# Patient Record
Sex: MALE | Race: OTHER | Employment: FULL TIME | URBAN - METROPOLITAN AREA
[De-identification: names, ages, dates, MRNs, and addresses within clinical notes are randomized per-mention and may not be internally consistent; named-entity substitution may affect disease eponyms.]

---

## 2020-06-24 ENCOUNTER — HOSPITAL ENCOUNTER (EMERGENCY)
Facility: HOSPITAL | Age: 40
Discharge: HOME | End: 2020-06-24
Attending: EMERGENCY MEDICINE
Payer: COMMERCIAL

## 2020-06-24 ENCOUNTER — APPOINTMENT (OUTPATIENT)
Dept: CT IMAGING | Facility: HOSPITAL | Age: 40
End: 2020-06-24
Attending: PHYSICIAN ASSISTANT
Payer: COMMERCIAL

## 2020-06-24 VITALS
BODY MASS INDEX: 28.25 KG/M2 | TEMPERATURE: 99 F | SYSTOLIC BLOOD PRESSURE: 101 MMHG | HEIGHT: 67 IN | OXYGEN SATURATION: 97 % | WEIGHT: 180 LBS | HEART RATE: 60 BPM | RESPIRATION RATE: 15 BRPM | DIASTOLIC BLOOD PRESSURE: 63 MMHG

## 2020-06-24 DIAGNOSIS — N20.1 LEFT URETERAL STONE: Primary | ICD-10-CM

## 2020-06-24 PROCEDURE — 99284 EMERGENCY DEPT VISIT MOD MDM: CPT

## 2020-06-24 PROCEDURE — 85025 COMPLETE CBC W/AUTO DIFF WBC: CPT | Performed by: EMERGENCY MEDICINE

## 2020-06-24 PROCEDURE — 83690 ASSAY OF LIPASE: CPT

## 2020-06-24 PROCEDURE — 80053 COMPREHEN METABOLIC PANEL: CPT

## 2020-06-24 PROCEDURE — 80053 COMPREHEN METABOLIC PANEL: CPT | Performed by: EMERGENCY MEDICINE

## 2020-06-24 PROCEDURE — 85025 COMPLETE CBC W/AUTO DIFF WBC: CPT

## 2020-06-24 PROCEDURE — 81001 URINALYSIS AUTO W/SCOPE: CPT | Performed by: EMERGENCY MEDICINE

## 2020-06-24 PROCEDURE — 74176 CT ABD & PELVIS W/O CONTRAST: CPT | Performed by: PHYSICIAN ASSISTANT

## 2020-06-24 PROCEDURE — 96374 THER/PROPH/DIAG INJ IV PUSH: CPT

## 2020-06-24 PROCEDURE — 83690 ASSAY OF LIPASE: CPT | Performed by: EMERGENCY MEDICINE

## 2020-06-24 RX ORDER — KETOROLAC TROMETHAMINE 30 MG/ML
15 INJECTION, SOLUTION INTRAMUSCULAR; INTRAVENOUS ONCE
Status: COMPLETED | OUTPATIENT
Start: 2020-06-24 | End: 2020-06-24

## 2020-06-24 NOTE — ED INITIAL ASSESSMENT (HPI)
Pt here due to lower right ABD pain that started this am about 1100. Pt has a Hx of kidney stones and this feels the same way.

## 2020-06-24 NOTE — ED PROVIDER NOTES
Patient Seen in: [facility] Emergency Department      History   Patient presents with:  Abdomen/Flank Pain    Stated Complaint: abdominal pain, hx of kidney stones, pain feels similiar.      HPI    CHIEF COMPLAINT: Left lower quadrant pain, concerned for stated complaint: abdominal pain, hx of kidney stones, pain feels similiar. Other systems are as noted in HPI. Constitutional and vital signs reviewed. All other systems reviewed and negative except as noted above.     Physical Exam     ED Elijah Gaytan limits   URINALYSIS WITH CULTURE REFLEX - Abnormal; Notable for the following components:    Clarity Urine Hazy (*)     Glucose Urine 50  (*)     Blood Urine Large (*)     RBC URINE >10 (*)     Mucous Urine 1+ (*)     All other components within normal [place] ureteral calculus. No obvious renal mass. ADRENALS:  No mass or enlargement. LIVER:  There is fatty infiltration of the liver. BILIARY:  Status post cholecystectomy. PANCREAS:  No lesion, fluid collection, ductal dilatation, or atrophy.   SPLEEN:  No enl (primary encounter diagnosis)    Disposition:  Discharge  6/24/2020  6:12 pm    Follow-up:  Luis Daniel Sharpe MD  [address] [place] [address] [phone]      urology follow up          Medications Prescribed:  There are no discharge

## 2020-12-02 ENCOUNTER — APPOINTMENT (OUTPATIENT)
Dept: CT IMAGING | Facility: HOSPITAL | Age: 40
End: 2020-12-02
Attending: EMERGENCY MEDICINE
Payer: COMMERCIAL

## 2020-12-02 ENCOUNTER — HOSPITAL ENCOUNTER (EMERGENCY)
Facility: HOSPITAL | Age: 40
Discharge: HOME | End: 2020-12-03
Attending: EMERGENCY MEDICINE
Payer: COMMERCIAL

## 2020-12-02 DIAGNOSIS — K11.20 SIALOADENITIS: Primary | ICD-10-CM

## 2020-12-02 PROCEDURE — 87147 CULTURE TYPE IMMUNOLOGIC: CPT | Performed by: EMERGENCY MEDICINE

## 2020-12-02 PROCEDURE — 96375 TX/PRO/DX INJ NEW DRUG ADDON: CPT

## 2020-12-02 PROCEDURE — 93010 ELECTROCARDIOGRAM REPORT: CPT

## 2020-12-02 PROCEDURE — 99285 EMERGENCY DEPT VISIT HI MDM: CPT

## 2020-12-02 PROCEDURE — 87081 CULTURE SCREEN ONLY: CPT | Performed by: EMERGENCY MEDICINE

## 2020-12-02 PROCEDURE — 96374 THER/PROPH/DIAG INJ IV PUSH: CPT

## 2020-12-02 PROCEDURE — 87430 STREP A AG IA: CPT | Performed by: EMERGENCY MEDICINE

## 2020-12-02 PROCEDURE — 85025 COMPLETE CBC W/AUTO DIFF WBC: CPT | Performed by: EMERGENCY MEDICINE

## 2020-12-02 PROCEDURE — 80053 COMPREHEN METABOLIC PANEL: CPT | Performed by: EMERGENCY MEDICINE

## 2020-12-02 PROCEDURE — 70491 CT SOFT TISSUE NECK W/DYE: CPT | Performed by: EMERGENCY MEDICINE

## 2020-12-02 RX ORDER — ONDANSETRON 2 MG/ML
4 INJECTION INTRAMUSCULAR; INTRAVENOUS ONCE
Status: COMPLETED | OUTPATIENT
Start: 2020-12-02 | End: 2020-12-02

## 2020-12-02 RX ORDER — MORPHINE SULFATE 4 MG/ML
4 INJECTION, SOLUTION INTRAMUSCULAR; INTRAVENOUS ONCE
Status: COMPLETED | OUTPATIENT
Start: 2020-12-02 | End: 2020-12-02

## 2020-12-03 VITALS
OXYGEN SATURATION: 98 % | HEIGHT: 66 IN | SYSTOLIC BLOOD PRESSURE: 131 MMHG | WEIGHT: 180 LBS | HEART RATE: 99 BPM | BODY MASS INDEX: 28.93 KG/M2 | DIASTOLIC BLOOD PRESSURE: 80 MMHG | RESPIRATION RATE: 16 BRPM | TEMPERATURE: 99 F

## 2020-12-03 PROCEDURE — 93005 ELECTROCARDIOGRAM TRACING: CPT

## 2020-12-03 RX ORDER — HYDROCODONE BITARTRATE AND ACETAMINOPHEN 5; 325 MG/1; MG/1
1-2 TABLET ORAL EVERY 6 HOURS PRN
Qty: 10 TABLET | Refills: 0 | Status: SHIPPED | OUTPATIENT
Start: 2020-12-03 | End: 2020-12-06

## 2020-12-03 RX ORDER — AMOXICILLIN AND CLAVULANATE POTASSIUM 875; 125 MG/1; MG/1
875 TABLET, FILM COATED ORAL ONCE
Status: COMPLETED | OUTPATIENT
Start: 2020-12-03 | End: 2020-12-03

## 2020-12-03 RX ORDER — AMOXICILLIN AND CLAVULANATE POTASSIUM 875; 125 MG/1; MG/1
1 TABLET, FILM COATED ORAL 2 TIMES DAILY
Qty: 20 TABLET | Refills: 0 | Status: SHIPPED | OUTPATIENT
Start: 2020-12-03 | End: 2020-12-13

## 2020-12-03 NOTE — ED PROVIDER NOTES
Patient Seen in: [facility] Emergency Department      History   Patient presents with:  Sore Throat    Stated Complaint: sore throat, pain with swallowing, vocalizing wnl    HPI    Patient is a 71-year-old male comes emergency room for evaluation of Supple, trachea midline. Patient has significant tenderness to his left submandibular region. No obvious swelling in this region. Right side the neck is nontender.   Anterior neck is nontender  LUNG: Lungs clear to auscultation bilaterally, no wheezing, INDICATIONS:  sore throat, pain with swallowing, vocalizing wnl  TECHNIQUE:  IV contrast-enhanced multislice CT scanning is performed through the neck soft tissues during administration of nonionic contrast.  Dose reduction techniques were used.  Dose info (has no administration in time range)   morphINE sulfate (PF) 4 MG/ML injection 4 mg (4 mg Intravenous Given 12/2/20 2311)   ondansetron HCl (ZOFRAN) injection 4 mg (4 mg Intravenous Given 12/2/20 2311)   iohexol (OMNIPAQUE) 350 MG/ML injection 50 mL (50 m Clavulanate 875-125 MG Oral Tab  Take 1 tablet by mouth 2 (two) times daily for 10 days. Qty: 20 tablet Refills: 0    HYDROcodone-acetaminophen 5-325 MG Oral Tab  Take 1-2 tablets by mouth every 6 (six) hours as needed for Pain.   Qty: 10 tablet Refills: 0

## 2020-12-03 NOTE — ED INITIAL ASSESSMENT (HPI)
Pt states he turned head to speak with family member and felt a pain to left side of neck. Pt states neck feels tight, swollen and painful to swallow.

## 2021-11-02 ENCOUNTER — HOSPITAL ENCOUNTER (OUTPATIENT)
Age: 41
Discharge: HOME | End: 2021-11-02
Payer: COMMERCIAL

## 2021-11-02 VITALS
HEART RATE: 72 BPM | DIASTOLIC BLOOD PRESSURE: 99 MMHG | WEIGHT: 180 LBS | RESPIRATION RATE: 18 BRPM | BODY MASS INDEX: 28.93 KG/M2 | SYSTOLIC BLOOD PRESSURE: 151 MMHG | OXYGEN SATURATION: 98 % | TEMPERATURE: 98 F | HEIGHT: 66 IN

## 2021-11-02 DIAGNOSIS — J06.9 VIRAL URI: Primary | ICD-10-CM

## 2021-11-02 DIAGNOSIS — Z11.52 ENCOUNTER FOR SCREENING FOR COVID-19: ICD-10-CM

## 2021-11-02 PROCEDURE — 99213 OFFICE O/P EST LOW 20 MIN: CPT | Performed by: NURSE PRACTITIONER

## 2021-11-02 PROCEDURE — U0002 COVID-19 LAB TEST NON-CDC: HCPCS | Performed by: NURSE PRACTITIONER

## 2021-11-02 NOTE — ED PROVIDER NOTES
Patient Seen in: [facility] [address] [place], [place] [address]way      History   Patient presents with:  Nasal Congestion    Stated Complaint: bodyache, congestion    Subjective: This is a 24-year-old male with below stated medical history.   Presents to immediate care above.    Physical Exam     ED Triage Vitals [11/02/21 1111]   BP (!) 151/99   Pulse 72   Resp 18   Temp 97.6 °F (36.4 °C)   Temp src Tympanic   SpO2 98 %   O2 Device None (Room air)       Current:BP (!) 151/99   Pulse 72   Temp 97.6 °F (36.4 °C) (Tympanic Reviewed   RAPID SARS-COV-2 BY PCR - Normal          Rapid covid. MDM      Vital signs stable. Patient is well-appearing and nontoxic looking. Presents to immediate care for upper respiratory symptoms. Lung sounds clear bilaterally.  No respiratory

## 2022-01-04 ENCOUNTER — APPOINTMENT (OUTPATIENT)
Dept: GENERAL RADIOLOGY | Age: 42
End: 2022-01-04
Attending: NURSE PRACTITIONER
Payer: COMMERCIAL

## 2022-01-04 ENCOUNTER — HOSPITAL ENCOUNTER (OUTPATIENT)
Age: 42
Discharge: HOME | End: 2022-01-04
Payer: COMMERCIAL

## 2022-01-04 VITALS
OXYGEN SATURATION: 97 % | HEIGHT: 66 IN | BODY MASS INDEX: 28.93 KG/M2 | RESPIRATION RATE: 16 BRPM | DIASTOLIC BLOOD PRESSURE: 89 MMHG | SYSTOLIC BLOOD PRESSURE: 126 MMHG | TEMPERATURE: 98 F | WEIGHT: 180 LBS | HEART RATE: 79 BPM

## 2022-01-04 DIAGNOSIS — B34.9 VIRAL ILLNESS: Primary | ICD-10-CM

## 2022-01-04 LAB
S PYO AG THROAT QL: NEGATIVE
SARS-COV-2 RNA RESP QL NAA+PROBE: NOT DETECTED

## 2022-01-04 PROCEDURE — U0002 COVID-19 LAB TEST NON-CDC: HCPCS | Performed by: NURSE PRACTITIONER

## 2022-01-04 PROCEDURE — 87880 STREP A ASSAY W/OPTIC: CPT | Performed by: NURSE PRACTITIONER

## 2022-01-04 PROCEDURE — 99213 OFFICE O/P EST LOW 20 MIN: CPT | Performed by: NURSE PRACTITIONER

## 2022-01-04 PROCEDURE — 71046 X-RAY EXAM CHEST 2 VIEWS: CPT | Performed by: NURSE PRACTITIONER

## 2022-01-04 NOTE — ED INITIAL ASSESSMENT (HPI)
x2 days Pt c/o fever (tmax 102), ST, cough, body aches, diarrhea (after taking a suppository), nausea, int SOB    Michael: vomiting

## 2022-01-04 NOTE — ED PROVIDER NOTES
Patient Seen in: [facility] [address] [place] [address]      History   Patient presents with:  Fever  Nausea  Diarrhea  Sore Throat  Cough/URI    Stated Complaint: Sore throat, body aches and GI problems. Subjective:    This is a 33-year-old male with no sig and negative except as noted above.     Physical Exam     ED Triage Vitals [01/04/22 1402]   /89   Pulse 79   Resp 16   Temp 97.6 °F (36.4 °C)   Temp src Temporal   SpO2 97 %   O2 Device None (Room air)       Current:/89   Pulse 79   Temp 97.6 ° is no abdominal tenderness. There is no guarding or rebound. Musculoskeletal:      Cervical back: Neck supple. Right lower leg: No edema. Left lower leg: No edema. Skin:     General: Skin is warm and dry.       Capillary Refill: Capillary refi Discharge Medication List

## 2022-11-07 ENCOUNTER — APPOINTMENT (OUTPATIENT)
Dept: GENERAL RADIOLOGY | Age: 42
End: 2022-11-07
Attending: PHYSICIAN ASSISTANT
Payer: COMMERCIAL

## 2022-11-07 ENCOUNTER — HOSPITAL ENCOUNTER (OUTPATIENT)
Age: 42
Discharge: HOME | End: 2022-11-07
Payer: COMMERCIAL

## 2022-11-07 VITALS
HEART RATE: 108 BPM | TEMPERATURE: 98 F | OXYGEN SATURATION: 98 % | DIASTOLIC BLOOD PRESSURE: 84 MMHG | RESPIRATION RATE: 20 BRPM | SYSTOLIC BLOOD PRESSURE: 151 MMHG

## 2022-11-07 DIAGNOSIS — J10.1 INFLUENZA A: ICD-10-CM

## 2022-11-07 DIAGNOSIS — R06.2 WHEEZING: Primary | ICD-10-CM

## 2022-11-07 LAB
POCT INFLUENZA A: POSITIVE
POCT INFLUENZA B: NEGATIVE

## 2022-11-07 PROCEDURE — 87502 INFLUENZA DNA AMP PROBE: CPT | Performed by: PHYSICIAN ASSISTANT

## 2022-11-07 PROCEDURE — 99213 OFFICE O/P EST LOW 20 MIN: CPT | Performed by: PHYSICIAN ASSISTANT

## 2022-11-07 PROCEDURE — 94640 AIRWAY INHALATION TREATMENT: CPT | Performed by: PHYSICIAN ASSISTANT

## 2022-11-07 PROCEDURE — 71046 X-RAY EXAM CHEST 2 VIEWS: CPT | Performed by: PHYSICIAN ASSISTANT

## 2022-11-07 RX ORDER — DEXAMETHASONE 4 MG/1
16 TABLET ORAL ONCE
Status: COMPLETED | OUTPATIENT
Start: 2022-11-07 | End: 2022-11-07

## 2022-11-07 RX ORDER — ALBUTEROL SULFATE 90 UG/1
2 AEROSOL, METERED RESPIRATORY (INHALATION) ONCE
Status: COMPLETED | OUTPATIENT
Start: 2022-11-07 | End: 2022-11-07

## 2022-11-07 NOTE — DISCHARGE INSTRUCTIONS
Please return to the ER/clinic if symptoms worsen. Follow-up with your PCP in 24-48 hours as needed. The Decadron will work in your system the next several days. Use your inhaler every 4-6 hours as needed. Drink plenty of fluids. Recommend over-the-counter antihistamine daily i.e. Allegra, Claritin or Zyrtec. Take Motrin and/or Tylenol for fever and pain. If symptoms persist or worsen i.e. increasing fever shortness of breath go to the emergency room. Otherwise follow-up with your primary care physician for further evaluation and treatment.

## 2022-11-07 NOTE — ED INITIAL ASSESSMENT (HPI)
Patient reports sore throat, bodyaches, cough/congestion. Taking tylenol consistently. Son also sick at home.

## 2023-05-03 ENCOUNTER — HOSPITAL ENCOUNTER (OUTPATIENT)
Age: 43
Discharge: HOME | End: 2023-05-03
Payer: COMMERCIAL

## 2023-05-03 VITALS
RESPIRATION RATE: 18 BRPM | HEIGHT: 66 IN | OXYGEN SATURATION: 98 % | TEMPERATURE: 98 F | SYSTOLIC BLOOD PRESSURE: 141 MMHG | HEART RATE: 89 BPM | BODY MASS INDEX: 28.93 KG/M2 | WEIGHT: 180 LBS | DIASTOLIC BLOOD PRESSURE: 90 MMHG

## 2023-05-03 DIAGNOSIS — H01.004 BLEPHARITIS OF LEFT UPPER EYELID, UNSPECIFIED TYPE: Primary | ICD-10-CM

## 2023-05-03 PROCEDURE — 99213 OFFICE O/P EST LOW 20 MIN: CPT | Performed by: NURSE PRACTITIONER

## 2023-05-03 RX ORDER — ERYTHROMYCIN 5 MG/G
1 OINTMENT OPHTHALMIC EVERY 6 HOURS
Qty: 1 G | Refills: 0 | Status: SHIPPED | OUTPATIENT
Start: 2023-05-03 | End: 2023-05-10

## 2023-05-03 NOTE — ED INITIAL ASSESSMENT (HPI)
Pt c/o left eye pain and swelling for 2 days. Pt states his eye is also red. Pt states he has pain to his left eye.

## 2023-08-27 ENCOUNTER — APPOINTMENT (OUTPATIENT)
Dept: CT IMAGING | Facility: HOSPITAL | Age: 43
End: 2023-08-27
Attending: PHYSICIAN ASSISTANT
Payer: COMMERCIAL

## 2023-08-27 ENCOUNTER — HOSPITAL ENCOUNTER (OUTPATIENT)
Age: 43
Discharge: HOME | End: 2023-08-27
Payer: COMMERCIAL

## 2023-08-27 VITALS
WEIGHT: 180 LBS | OXYGEN SATURATION: 97 % | BODY MASS INDEX: 29 KG/M2 | RESPIRATION RATE: 16 BRPM | SYSTOLIC BLOOD PRESSURE: 148 MMHG | HEART RATE: 90 BPM | TEMPERATURE: 98 F | DIASTOLIC BLOOD PRESSURE: 97 MMHG

## 2023-08-27 DIAGNOSIS — R31.9 HEMATURIA, UNSPECIFIED TYPE: Primary | ICD-10-CM

## 2023-08-27 DIAGNOSIS — R73.9 HYPERGLYCEMIA: ICD-10-CM

## 2023-08-27 DIAGNOSIS — N13.2 URETERAL STONE WITH HYDRONEPHROSIS: ICD-10-CM

## 2023-08-27 DIAGNOSIS — R10.9 FLANK PAIN: ICD-10-CM

## 2023-08-27 LAB
#MXD IC: 0.4 X10ˆ3/UL (ref 0.1–1)
BILIRUB UR QL STRIP: NEGATIVE
BUN BLD-MCNC: 17 MG/DL (ref 7–18)
CHLORIDE BLD-SCNC: 105 MMOL/L (ref 98–112)
CLARITY UR: CLEAR
CO2 BLD-SCNC: 24 MMOL/L (ref 21–32)
COLOR UR: YELLOW
CREAT BLD-MCNC: 1.2 MG/DL
EGFRCR SERPLBLD CKD-EPI 2021: 78 ML/MIN/1.73M2 (ref 60–?)
GLUCOSE BLD-MCNC: 180 MG/DL (ref 70–99)
GLUCOSE UR STRIP-MCNC: 250 MG/DL
HCT VFR BLD AUTO: 42.1 %
HCT VFR BLD CALC: 44 %
HGB BLD-MCNC: 13.6 G/DL
ISTAT IONIZED CALCIUM FOR CHEM 8: 1.17 MMOL/L (ref 1.12–1.32)
KETONES UR STRIP-MCNC: NEGATIVE MG/DL
LEUKOCYTE ESTERASE UR QL STRIP: NEGATIVE
LYMPHOCYTES # BLD AUTO: 2.1 X10ˆ3/UL (ref 1–4)
LYMPHOCYTES NFR BLD AUTO: 35.2 %
MCH RBC QN AUTO: 28.5 PG (ref 26–34)
MCHC RBC AUTO-ENTMCNC: 32.3 G/DL (ref 31–37)
MCV RBC AUTO: 88.1 FL (ref 80–100)
MIXED CELL %: 7.1 %
NEUTROPHILS # BLD AUTO: 3.6 X10ˆ3/UL (ref 1.5–7.7)
NEUTROPHILS NFR BLD AUTO: 57.7 %
NITRITE UR QL STRIP: NEGATIVE
PH UR STRIP: 6 [PH]
PLATELET # BLD AUTO: 318 X10ˆ3/UL (ref 150–450)
POTASSIUM BLD-SCNC: 4 MMOL/L (ref 3.6–5.1)
PROT UR STRIP-MCNC: NEGATIVE MG/DL
RBC # BLD AUTO: 4.78 X10ˆ6/UL
SODIUM BLD-SCNC: 138 MMOL/L (ref 136–145)
SP GR UR STRIP: 1.01
UROBILINOGEN UR STRIP-ACNC: <2 MG/DL
WBC # BLD AUTO: 6.1 X10ˆ3/UL (ref 4–11)

## 2023-08-27 PROCEDURE — 85025 COMPLETE CBC W/AUTO DIFF WBC: CPT | Performed by: PHYSICIAN ASSISTANT

## 2023-08-27 PROCEDURE — 81002 URINALYSIS NONAUTO W/O SCOPE: CPT | Performed by: PHYSICIAN ASSISTANT

## 2023-08-27 PROCEDURE — 80047 BASIC METABLC PNL IONIZED CA: CPT | Performed by: PHYSICIAN ASSISTANT

## 2023-08-27 PROCEDURE — 99214 OFFICE O/P EST MOD 30 MIN: CPT | Performed by: PHYSICIAN ASSISTANT

## 2023-08-27 PROCEDURE — 74176 CT ABD & PELVIS W/O CONTRAST: CPT | Performed by: PHYSICIAN ASSISTANT

## 2023-08-27 PROCEDURE — 96374 THER/PROPH/DIAG INJ IV PUSH: CPT | Performed by: PHYSICIAN ASSISTANT

## 2023-08-27 RX ORDER — KETOROLAC TROMETHAMINE 15 MG/ML
15 INJECTION, SOLUTION INTRAMUSCULAR; INTRAVENOUS ONCE
Status: COMPLETED | OUTPATIENT
Start: 2023-08-27 | End: 2023-08-27

## 2023-08-27 RX ORDER — HYDROCODONE BITARTRATE AND ACETAMINOPHEN 5; 325 MG/1; MG/1
1-2 TABLET ORAL EVERY 6 HOURS PRN
Qty: 10 TABLET | Refills: 0 | Status: SHIPPED | OUTPATIENT
Start: 2023-08-27 | End: 2023-09-01

## 2023-08-27 RX ORDER — TAMSULOSIN HYDROCHLORIDE 0.4 MG/1
0.4 CAPSULE ORAL DAILY
Qty: 7 CAPSULE | Refills: 0 | Status: SHIPPED | OUTPATIENT
Start: 2023-08-27 | End: 2023-09-03

## 2023-08-27 RX ORDER — CETIRIZINE HYDROCHLORIDE 10 MG/1
CAPSULE, LIQUID FILLED ORAL
COMMUNITY

## 2023-08-27 NOTE — ED PROVIDER NOTES
79-year-old male who was seen at our [address] [place] [address] for possible kidney stone was sent to the main spittle was found to have a solid then millimeter distal stone but is not febrile and was under pain control. He will be initiated on Flomax and have follow-up but was made aware that he will need to go to the emergency department should he develop fever or unbearable pain or persistent vomiting.

## 2023-08-27 NOTE — ED INITIAL ASSESSMENT (HPI)
Pt with a strong PMH of kidney stones, pt has had back pain since ftiday 2 days ago , and now the pain is worse.

## 2023-08-27 NOTE — DISCHARGE INSTRUCTIONS
Take Flomax as directed. Continue ibuprofen as needed for pain. Take Norco as needed for moderate to severe pain. Do not drink alcohol or drive while you are on the [address].    Follow-up with urology. If you have new, changing or worsening symptoms go to the ER. Please go to outpatient imaging at the [facility] ([address] [facility] [address] in [place]). Wait there until we have discussed your test results.

## 2023-10-02 ENCOUNTER — LAB ENCOUNTER (OUTPATIENT)
Dept: LAB | Facility: HOSPITAL | Age: 43
End: 2023-10-02
Attending: PHYSICIAN ASSISTANT
Payer: COMMERCIAL

## 2023-10-02 ENCOUNTER — HOSPITAL ENCOUNTER (OUTPATIENT)
Dept: GENERAL RADIOLOGY | Facility: HOSPITAL | Age: 43
Discharge: HOME | End: 2023-10-02
Attending: PHYSICIAN ASSISTANT
Payer: COMMERCIAL

## 2023-10-02 ENCOUNTER — OFFICE VISIT (OUTPATIENT)
Dept: SURGERY | Facility: CLINIC | Age: 43
End: 2023-10-02

## 2023-10-02 ENCOUNTER — TELEPHONE (OUTPATIENT)
Dept: SURGERY | Facility: CLINIC | Age: 43
End: 2023-10-02

## 2023-10-02 DIAGNOSIS — N20.1 URETERAL STONE: Primary | ICD-10-CM

## 2023-10-02 DIAGNOSIS — N20.0 NEPHROLITHIASIS: ICD-10-CM

## 2023-10-02 DIAGNOSIS — N20.1 URETERAL STONE: ICD-10-CM

## 2023-10-02 DIAGNOSIS — N20.1 RIGHT URETERAL STONE: Primary | ICD-10-CM

## 2023-10-02 DIAGNOSIS — N13.30 HYDRONEPHROSIS, UNSPECIFIED HYDRONEPHROSIS TYPE: ICD-10-CM

## 2023-10-02 LAB
ANION GAP SERPL CALC-SCNC: 5 MMOL/L (ref 0–18)
BASOPHILS # BLD AUTO: 0.06 X10(3) UL (ref 0–0.2)
BASOPHILS NFR BLD AUTO: 0.8 %
BUN BLD-MCNC: 14 MG/DL (ref 7–18)
CALCIUM BLD-MCNC: 9.3 MG/DL (ref 8.5–10.1)
CHLORIDE SERPL-SCNC: 106 MMOL/L (ref 98–112)
CO2 SERPL-SCNC: 27 MMOL/L (ref 21–32)
CREAT BLD-MCNC: 1.17 MG/DL
EGFRCR SERPLBLD CKD-EPI 2021: 80 ML/MIN/1.73M2 (ref 60–?)
EOSINOPHIL # BLD AUTO: 0.09 X10(3) UL (ref 0–0.7)
EOSINOPHIL NFR BLD AUTO: 1.2 %
ERYTHROCYTE [DISTWIDTH] IN BLOOD BY AUTOMATED COUNT: 13.5 %
FASTING STATUS PATIENT QL REPORTED: YES
GLUCOSE BLD-MCNC: 105 MG/DL (ref 70–99)
HCT VFR BLD AUTO: 43.6 %
HGB BLD-MCNC: 14.2 G/DL
IMM GRANULOCYTES # BLD AUTO: 0.02 X10(3) UL (ref 0–1)
IMM GRANULOCYTES NFR BLD: 0.3 %
LYMPHOCYTES # BLD AUTO: 1.81 X10(3) UL (ref 1–4)
LYMPHOCYTES NFR BLD AUTO: 23.5 %
MCH RBC QN AUTO: 28.3 PG (ref 26–34)
MCHC RBC AUTO-ENTMCNC: 32.6 G/DL (ref 31–37)
MCV RBC AUTO: 86.9 FL
MONOCYTES # BLD AUTO: 0.61 X10(3) UL (ref 0.1–1)
MONOCYTES NFR BLD AUTO: 7.9 %
NEUTROPHILS # BLD AUTO: 5.12 X10 (3) UL (ref 1.5–7.7)
NEUTROPHILS # BLD AUTO: 5.12 X10(3) UL (ref 1.5–7.7)
NEUTROPHILS NFR BLD AUTO: 66.3 %
OSMOLALITY SERPL CALC.SUM OF ELEC: 287 MOSM/KG (ref 275–295)
PLATELET # BLD AUTO: 299 10(3)UL (ref 150–450)
POTASSIUM SERPL-SCNC: 4 MMOL/L (ref 3.5–5.1)
RBC # BLD AUTO: 5.02 X10(6)UL
SODIUM SERPL-SCNC: 138 MMOL/L (ref 136–145)
WBC # BLD AUTO: 7.7 X10(3) UL (ref 4–11)

## 2023-10-02 PROCEDURE — 74018 RADEX ABDOMEN 1 VIEW: CPT | Performed by: PHYSICIAN ASSISTANT

## 2023-10-02 PROCEDURE — 80048 BASIC METABOLIC PNL TOTAL CA: CPT

## 2023-10-02 PROCEDURE — 85025 COMPLETE CBC W/AUTO DIFF WBC: CPT

## 2023-10-02 PROCEDURE — 36415 COLL VENOUS BLD VENIPUNCTURE: CPT

## 2023-10-02 PROCEDURE — 87086 URINE CULTURE/COLONY COUNT: CPT

## 2023-10-02 RX ORDER — HYDROCODONE BITARTRATE AND ACETAMINOPHEN 5; 325 MG/1; MG/1
1 TABLET ORAL EVERY 6 HOURS PRN
Qty: 30 TABLET | Refills: 0 | Status: SHIPPED | OUTPATIENT
Start: 2023-10-02

## 2023-10-02 RX ORDER — TAMSULOSIN HYDROCHLORIDE 0.4 MG/1
0.4 CAPSULE ORAL EVERY EVENING
Qty: 21 CAPSULE | Refills: 0 | Status: SHIPPED | OUTPATIENT
Start: 2023-10-02

## 2023-10-02 NOTE — TELEPHONE ENCOUNTER
Urology Surgery Request  Surgeon: any available  Location (if known): [facility]  Procedure: cystoscopy, right retrograde pyelogram, right ureteroscopy, laser lithotripsy, stone extraction, right ureteral stent placement   Anesthesia: General   Time Frame: first available  Time required: 60 minutes  Diagnosis: right ureteral stone with hydronephrosis    Antibiotics: per hospital protocol unless checked below   ___ Levaquin 500 mg IV   ___ Gemcitabine 2g/mL NS bladder instillation to be given in OR    Estimated Post Op/Follow Up Appt: ~ 1 week for cysto, stent removal. Please schedule appointment at time of surgery scheduling.

## 2023-10-02 NOTE — PROGRESS NOTES
[facility], Eric Riojas    Urology Consult Note    History of Present Illness:   Patient is a 41year old male with seasonal allergies who presents today for consultation from Dr. Oleg Humphreys office for right ureteral stone. Patient presented to the [facility] 8/27/23 for right flank pain. CT scan was obtained that showed 7 mm distal right ureteral stone with moderate hydronephrosis and additional 4 mm right non obstructing stone and left 2 mm non obstructing stone. UA non infectious. Labs normal white count and serum creatinine 1.20 mg/dL. Was on tamsulosin 0.4mg for approximately one week. Patient continues to have intermittent pain, more into the abdomen currently. Stopped straining after 2 weeks. Increased urinary frequency, urgency, weak urine stream. No dysuria or gross hematuria. No fevers. Fluids: 32 oz of water minimal, tries to get 64 oz. No lemon/lime  High salt dietary intake. Prior kidney stones 5-6 years ago, spontaneous passage. Prior ureteroscopy 4-5 years. Unsure of stone composition. No prior 24 hour urine collection. HISTORY:  Past Medical History:   Diagnosis Date    Kidney calculi       Past Surgical History:   Procedure Laterality Date    REMOVAL GALLBLADDER      REMOVAL OF KIDNEY STONE        No family history on file. Social History:   Social History     Socioeconomic History    Marital status:    Tobacco Use    Smoking status: Every Day     Packs/day: .5     Types: Cigarettes    Smokeless tobacco: Never   Vaping Use    Vaping Use: Some days   Substance and Sexual Activity    Alcohol use: Yes    Drug use: Yes     Types: Cannabis        Allergies  No Known Allergies    Review of Systems:   A 10-point review of systems was completed and is negative other than as noted above. Physical Exam:   There were no vitals taken for this visit.     GENERAL APPEARANCE: well developed, well nourished, in no acute distress  NEUROLOGIC: no localizing neurologic signs, alert and oriented x 3, converses appropriately  HEAD: atraumatic, normocephalic  EYES: sclera non-icteric  ORAL CAVITY: mucosa moist  NECK/THYROID: no obvious masses or goiter  LUNGS: non-labored breathing  ABDOMEN: soft, nontender, nondistended  No CVAT  EXTREMITIES: warm, well-perfused. No clubbing, cyanosis or edema. SKIN: no obvious rashes    Results:     Laboratory Data:  Lab Results   Component Value Date    WBC 10.5 12/02/2020    HGB 14.5 12/02/2020    .0 12/02/2020     Lab Results   Component Value Date     12/02/2020    K 3.5 12/02/2020     12/02/2020    CO2 26.0 12/02/2020    BUN 12 12/02/2020     (H) 12/02/2020    GFRAA 95 12/02/2020    AST 52 (H) 12/02/2020     (H) 12/02/2020    TP 7.9 12/02/2020    ALB 3.9 12/02/2020    CA 9.6 12/02/2020       Urinalysis Results (last three years):  Recent Labs     08/27/23  1046   SPECGRAVITY 1.010   GLUUR 250*       Urine Culture Results (last three years):  Lab Results   Component Value Date    URINECUL No Growth at 18-24 hrs. 08/27/2023       Imaging  No results found. Impression:     Patient is a 41year old male with seasonal allergies who presents today for consultation from Dr. Sara Joel office for right ureteral stone. Reviewed CT scan images with patient, reviewed ureteral stone present and additional non obstructing tiny renal stones. Reviewed labs unremarkable at time of diagnosis but risk of worsening renal function with persistent obstruction. Surgical risks, benefits and alternatives discussed. Risks of ureteroscopy including but not limited to infection, bleeding, anesthetic issues, possible need for stent, need for subsequent removal of stent, ureteral spasm, ureteral injury or stricture discussed with patient. We discussed stone prevention strategies at today's visit and I provided and reviewed educational materials for this.  I recommend drinking at least 40-60 ounces of water per day or enough water to keep urine clear. I also recommend the patient avoid a high sodium diet. Future 24 hour urine collection. Recommendations:  Check KUB, labs today. Send urine for culture. Resume tamulosin, strain all urine. Patient will schedule cystoscopy, right ureteroscopy, stone extraction, possible stent placement and possible laser lithotripsy at the patient's earliest convenience. To seek attention if fevers, intractable pain or vomiting. Thank you very much for this consult. Please call if there are any questions or concerns.      Joyce Morocho PA-C  Urology  [facility]    Date: 10/2/2023

## 2023-10-03 ENCOUNTER — TELEPHONE (OUTPATIENT)
Dept: SURGERY | Facility: CLINIC | Age: 43
End: 2023-10-03

## 2023-10-03 NOTE — TELEPHONE ENCOUNTER
Spoke with the patient. He'd like  Scheduling the surgery for  10/09/23. Reviewed the pre-op instructions and will send via My Chart once confirmed. Patient has a CT Scan that he will look to reschedule to this week as he'd like to get the procedure done. Also, he did take 600 mg of Ibuprofen and would like to know how long he will need to be off work. I advsd that I would have a nurse contact him. Patient can contact me at [phone].

## 2023-10-03 NOTE — TELEPHONE ENCOUNTER
Scheduling patient for surgery on Monday, 10/9/23. Patient would like to know how long he will need to be off of work and he also took 600mg of Ibuprofen. Is he able to have the surgery? Please contact to discuss the above.

## 2023-10-04 NOTE — TELEPHONE ENCOUNTER
Called pt to discuss below. Per , pt is to have 48 hours off of work. Pt requests letter to be typed up and placed on [employer]. Advised pt to not take ibuprofen, only tylenol. Verbalized understanding.

## 2023-10-06 ENCOUNTER — HOSPITAL ENCOUNTER (OUTPATIENT)
Dept: CT IMAGING | Age: 43
Discharge: HOME | End: 2023-10-06
Attending: PHYSICIAN ASSISTANT
Payer: COMMERCIAL

## 2023-10-06 DIAGNOSIS — N20.0 NEPHROLITHIASIS: ICD-10-CM

## 2023-10-06 DIAGNOSIS — N20.1 URETERAL STONE: ICD-10-CM

## 2023-10-06 PROCEDURE — 74176 CT ABD & PELVIS W/O CONTRAST: CPT | Performed by: PHYSICIAN ASSISTANT

## 2023-10-08 NOTE — H&P
[facility] Patient Status:  Hospital Outpatient Surgery    3/8/1980 MRN [id number]   [facility] SURGERY Attending Adelita Jarquin MD   Hosp Day # 0 PCP [address]     Primary Care Provider: Nicolasa Flores     Procedure     CYSTOSCOPY, RIGHT RETROGRADE PYELOGRAM, RIGHT URETEROSCOPY, LASER LITHOTRIPSY, STONE EXTRACTION, RIGHT URETERAL STENT PLACEMENT:     History of Present Illness:     Patient is a 41year old male with seasonal allergies who presents for management of right kidney stones. Prior kidney stones 5-6 years ago, spontaneous passage. Prior ureteroscopy 4-5 years. Unsure of stone composition. No prior 24 hour urine collection. Patient presented to the [facility] for right flank pain. CT scan was obtained that showed 7 mm distal right ureteral stone with moderate hydronephrosis and additional 4 mm right non obstructing stone and left 2 mm non obstructing stone. UA non infectious. Labs normal white count and serum creatinine 1.20 mg/dL. Was on tamsulosin 0.4mg for approximately one week. Patient continues to have intermittent pain, more into the abdomen currently. Stopped straining after 2 weeks. Increased urinary frequency, urgency, weak urine stream. No dysuria or gross hematuria. No fevers. Repeat CT scan was done on 10/6; Aguilar Conroy has progresed distally but remains in the distal ureter near the UVJ. He presents now for management. We discussed the risks of infection, bleeding, damage to the ureter with subsequent stricture formation, and the need for additional procedures. I told him that, in rare circumstances, we are unable to advance the ureteroscope into the ureter and would be formed to simple place a ureteral stent and attempt ureteroscopy again after two weeks of passive dilation.  We also discussed the possibility of post-op stent placement, even when the procedure is successfully performed, in order to encourage proper healing and prevent scar formation. Total stone burden:   RIGHT: 3mm mid-pole stone, 6mm distal ureteral stone   Tiny left sdied stones       Ucx: Negative     Stent size [id number]   5' 6\" (1.676 m)    History:     Past Medical History:   Diagnosis Date    Calculus of kidney     Kidney calculi     Sleep apnea     no equipment       Past Surgical History:   Procedure Laterality Date    REMOVAL GALLBLADDER      REMOVAL OF KIDNEY STONE         History reviewed. No pertinent family history. Social History     Socioeconomic History    Marital status:    Tobacco Use    Smoking status: Every Day     Packs/day: .5     Types: Cigarettes    Smokeless tobacco: Never   Vaping Use    Vaping Use: Never used   Substance and Sexual Activity    Alcohol use: Yes     Alcohol/week: 1.0 standard drink of alcohol     Types: 1 Standard drinks or equivalent per week    Drug use: Yes     Types: Cannabis       Medications:  Current Outpatient Medications   Medication Sig Dispense Refill    tamsulosin 0.4 MG Oral Cap Take 1 capsule (0.4 mg total) by mouth every evening. Take 1/2 hour following the same meal each day 21 capsule 0    HYDROcodone-acetaminophen (NORCO) 5-325 MG Oral Tab Take 1 tablet by mouth every 6 (six) hours as needed for Pain. 30 tablet 0    Cetirizine HCl (ZYRTEC ALLERGY) 10 MG Oral Cap          Allergies:  No Known Allergies    Review of Systems:   A comprehensive 10-point review of systems was completed. Pertinent positives and negatives are noted in the the HPI. Physical Exam:   Vital Signs:  Height 5' 6\" (1.676 m), weight 180 lb (81.6 kg).      CONSTITUTIONAL: Well developed, well nourished, in no acute distress   RESPIRATORY: Normal respiratory effort  ABDOMEN: Soft, non-tender, non-distended      Laboratory Data:  Lab Results   Component Value Date    WBC 7.7 10/02/2023    HGB 14.2 10/02/2023    .0 10/02/2023     Lab Results   Component Value Date     10/02/2023    K 4.0 10/02/2023     10/02/2023    CO2 27.0 10/02/2023    BUN 14 10/02/2023     (H) 10/02/2023    GFRAA 95 12/02/2020    AST 52 (H) 12/02/2020     (H) 12/02/2020    TP 7.9 12/02/2020    ALB 3.9 12/02/2020    CA 9.3 10/02/2023       Urinalysis Results (last three years):  Recent Labs     08/27/23  1046   SPECGRAVITY 1.010   GLUUR 250*       Urine Culture Results (last three years):  Lab Results   Component Value Date    URINECUL No Growth 2 Days 10/02/2023    URINECUL No Growth at 18-24 hrs. 08/27/2023       PSA:  No results found for: \"PSA\", \"PERCENTPSA\", \"PSAS\", \"PSAULTRA\"     Imaging (last three days):  CT ABDOMEN+PELVIS KIDNEYSTONE 2D RNDR(NO IV,NO ORAL)(CPT=74176)    Result Date: 10/6/2023  CONCLUSION:  No evidence of hydronephrosis. There are a few tiny punctate renal calculi present. A previously noted distal right ureter appears to have migrated more distally and is now just above the right ureterovesicular junction. LOCATION:  [id number]   Dictated by (CST): Velvet Freeman MD on 10/06/2023 at 3:51 PM     Finalized by (CST): Velvet Freeman MD on 10/06/2023 at 3:54 PM         Assessment:   Patient with history as above     Plan:     - OR for CYSTOSCOPY, RIGHT RETROGRADE PYELOGRAM, RIGHT URETEROSCOPY, LASER LITHOTRIPSY, STONE EXTRACTION, RIGHT URETERAL STENT PLACEMENT:   - NPO since midnight   - Antibiotics ordered for OR   - Informed consent obtained - risks and benefits explained, and all questions answered  - Marked appropriately     I have personally reviewed all relevant medical records, labs, and imaging.        Kathrin Mcardle, MD  Staff Urologist  [facility] [place]  Office: [phone]

## 2023-10-09 ENCOUNTER — HOSPITAL ENCOUNTER (OUTPATIENT)
Facility: HOSPITAL | Age: 43
Setting detail: HOSPITAL OUTPATIENT SURGERY
Discharge: HOME | End: 2023-10-09
Attending: UROLOGY | Admitting: UROLOGY
Payer: COMMERCIAL

## 2023-10-09 ENCOUNTER — APPOINTMENT (OUTPATIENT)
Dept: GENERAL RADIOLOGY | Facility: HOSPITAL | Age: 43
End: 2023-10-09
Attending: UROLOGY
Payer: COMMERCIAL

## 2023-10-09 ENCOUNTER — ANESTHESIA EVENT (OUTPATIENT)
Dept: SURGERY | Facility: HOSPITAL | Age: 43
End: 2023-10-09
Payer: COMMERCIAL

## 2023-10-09 ENCOUNTER — ANESTHESIA (OUTPATIENT)
Dept: SURGERY | Facility: HOSPITAL | Age: 43
End: 2023-10-09
Payer: COMMERCIAL

## 2023-10-09 ENCOUNTER — TELEPHONE (OUTPATIENT)
Dept: SURGERY | Facility: CLINIC | Age: 43
End: 2023-10-09

## 2023-10-09 VITALS
OXYGEN SATURATION: 100 % | DIASTOLIC BLOOD PRESSURE: 92 MMHG | TEMPERATURE: 99 F | RESPIRATION RATE: 18 BRPM | WEIGHT: 177 LBS | HEART RATE: 68 BPM | SYSTOLIC BLOOD PRESSURE: 129 MMHG | BODY MASS INDEX: 28.45 KG/M2 | HEIGHT: 66 IN

## 2023-10-09 DIAGNOSIS — N20.1 RIGHT URETERAL STONE: ICD-10-CM

## 2023-10-09 DIAGNOSIS — N13.30 HYDRONEPHROSIS, UNSPECIFIED HYDRONEPHROSIS TYPE: ICD-10-CM

## 2023-10-09 PROCEDURE — BT1D1ZZ FLUOROSCOPY OF RIGHT KIDNEY, URETER AND BLADDER USING LOW OSMOLAR CONTRAST: ICD-10-PCS | Performed by: UROLOGY

## 2023-10-09 PROCEDURE — 52352 CYSTOURETERO W/STONE REMOVE: CPT | Performed by: UROLOGY

## 2023-10-09 PROCEDURE — 0TC08ZZ EXTIRPATION OF MATTER FROM RIGHT KIDNEY, VIA NATURAL OR ARTIFICIAL OPENING ENDOSCOPIC: ICD-10-PCS | Performed by: UROLOGY

## 2023-10-09 PROCEDURE — 0T768DZ DILATION OF RIGHT URETER WITH INTRALUMINAL DEVICE, VIA NATURAL OR ARTIFICIAL OPENING ENDOSCOPIC: ICD-10-PCS | Performed by: UROLOGY

## 2023-10-09 PROCEDURE — 52356 CYSTO/URETERO W/LITHOTRIPSY: CPT | Performed by: UROLOGY

## 2023-10-09 PROCEDURE — 74420 UROGRAPHY RTRGR +-KUB: CPT | Performed by: UROLOGY

## 2023-10-09 DEVICE — URETERAL STENT
Type: IMPLANTABLE DEVICE | Site: URETER | Status: FUNCTIONAL
Brand: ASCERTA™

## 2023-10-09 RX ORDER — LIDOCAINE HYDROCHLORIDE 10 MG/ML
INJECTION, SOLUTION EPIDURAL; INFILTRATION; INTRACAUDAL; PERINEURAL AS NEEDED
Status: DISCONTINUED | OUTPATIENT
Start: 2023-10-09 | End: 2023-10-09 | Stop reason: SURG

## 2023-10-09 RX ORDER — MEPERIDINE HYDROCHLORIDE 25 MG/ML
12.5 INJECTION INTRAMUSCULAR; INTRAVENOUS; SUBCUTANEOUS AS NEEDED
Status: DISCONTINUED | OUTPATIENT
Start: 2023-10-09 | End: 2023-10-09

## 2023-10-09 RX ORDER — NALOXONE HYDROCHLORIDE 0.4 MG/ML
80 INJECTION, SOLUTION INTRAMUSCULAR; INTRAVENOUS; SUBCUTANEOUS AS NEEDED
Status: DISCONTINUED | OUTPATIENT
Start: 2023-10-09 | End: 2023-10-09

## 2023-10-09 RX ORDER — HYDROMORPHONE HYDROCHLORIDE 1 MG/ML
0.2 INJECTION, SOLUTION INTRAMUSCULAR; INTRAVENOUS; SUBCUTANEOUS EVERY 5 MIN PRN
Status: DISCONTINUED | OUTPATIENT
Start: 2023-10-09 | End: 2023-10-09

## 2023-10-09 RX ORDER — ONDANSETRON 2 MG/ML
INJECTION INTRAMUSCULAR; INTRAVENOUS AS NEEDED
Status: DISCONTINUED | OUTPATIENT
Start: 2023-10-09 | End: 2023-10-09 | Stop reason: SURG

## 2023-10-09 RX ORDER — HYDROMORPHONE HYDROCHLORIDE 1 MG/ML
0.4 INJECTION, SOLUTION INTRAMUSCULAR; INTRAVENOUS; SUBCUTANEOUS EVERY 5 MIN PRN
Status: DISCONTINUED | OUTPATIENT
Start: 2023-10-09 | End: 2023-10-09

## 2023-10-09 RX ORDER — SODIUM CHLORIDE, SODIUM LACTATE, POTASSIUM CHLORIDE, CALCIUM CHLORIDE 600; 310; 30; 20 MG/100ML; MG/100ML; MG/100ML; MG/100ML
INJECTION, SOLUTION INTRAVENOUS CONTINUOUS
Status: DISCONTINUED | OUTPATIENT
Start: 2023-10-09 | End: 2023-10-09

## 2023-10-09 RX ORDER — LABETALOL HYDROCHLORIDE 5 MG/ML
5 INJECTION, SOLUTION INTRAVENOUS EVERY 5 MIN PRN
Status: DISCONTINUED | OUTPATIENT
Start: 2023-10-09 | End: 2023-10-09

## 2023-10-09 RX ORDER — HYDROMORPHONE HYDROCHLORIDE 1 MG/ML
INJECTION, SOLUTION INTRAMUSCULAR; INTRAVENOUS; SUBCUTANEOUS
Status: COMPLETED
Start: 2023-10-09 | End: 2023-10-09

## 2023-10-09 RX ORDER — CEFAZOLIN SODIUM/WATER 2 G/20 ML
SYRINGE (ML) INTRAVENOUS
Status: DISCONTINUED
Start: 2023-10-09 | End: 2023-10-09

## 2023-10-09 RX ORDER — ACETAMINOPHEN 500 MG
1000 TABLET ORAL ONCE
Status: DISCONTINUED | OUTPATIENT
Start: 2023-10-09 | End: 2023-10-09 | Stop reason: HOSPADM

## 2023-10-09 RX ORDER — DIPHENHYDRAMINE HYDROCHLORIDE 50 MG/ML
12.5 INJECTION INTRAMUSCULAR; INTRAVENOUS AS NEEDED
Status: DISCONTINUED | OUTPATIENT
Start: 2023-10-09 | End: 2023-10-09

## 2023-10-09 RX ORDER — TAMSULOSIN HYDROCHLORIDE 0.4 MG/1
0.4 CAPSULE ORAL EVERY EVENING
Qty: 14 CAPSULE | Refills: 0 | Status: SHIPPED | OUTPATIENT
Start: 2023-10-09 | End: 2023-10-18

## 2023-10-09 RX ORDER — CEFAZOLIN SODIUM/WATER 2 G/20 ML
2 SYRINGE (ML) INTRAVENOUS ONCE
Status: COMPLETED | OUTPATIENT
Start: 2023-10-09 | End: 2023-10-09

## 2023-10-09 RX ORDER — SULFAMETHOXAZOLE AND TRIMETHOPRIM 800; 160 MG/1; MG/1
1 TABLET ORAL 2 TIMES DAILY
Qty: 4 TABLET | Refills: 0 | Status: SHIPPED | OUTPATIENT
Start: 2023-10-09 | End: 2023-10-11

## 2023-10-09 RX ORDER — HYDROMORPHONE HYDROCHLORIDE 1 MG/ML
0.6 INJECTION, SOLUTION INTRAMUSCULAR; INTRAVENOUS; SUBCUTANEOUS EVERY 5 MIN PRN
Status: DISCONTINUED | OUTPATIENT
Start: 2023-10-09 | End: 2023-10-09

## 2023-10-09 RX ORDER — OXYBUTYNIN CHLORIDE 5 MG/1
5 TABLET ORAL 3 TIMES DAILY PRN
Qty: 15 TABLET | Refills: 0 | Status: SHIPPED | OUTPATIENT
Start: 2023-10-09 | End: 2023-10-18 | Stop reason: ALTCHOICE

## 2023-10-09 RX ORDER — HYDROCODONE BITARTRATE AND ACETAMINOPHEN 5; 325 MG/1; MG/1
2 TABLET ORAL ONCE AS NEEDED
Status: DISCONTINUED | OUTPATIENT
Start: 2023-10-09 | End: 2023-10-09

## 2023-10-09 RX ORDER — EPHEDRINE SULFATE 50 MG/ML
INJECTION INTRAVENOUS AS NEEDED
Status: DISCONTINUED | OUTPATIENT
Start: 2023-10-09 | End: 2023-10-09 | Stop reason: SURG

## 2023-10-09 RX ORDER — LIDOCAINE HYDROCHLORIDE 20 MG/ML
JELLY TOPICAL AS NEEDED
Status: DISCONTINUED | OUTPATIENT
Start: 2023-10-09 | End: 2023-10-09 | Stop reason: HOSPADM

## 2023-10-09 RX ORDER — SCOLOPAMINE TRANSDERMAL SYSTEM 1 MG/1
1 PATCH, EXTENDED RELEASE TRANSDERMAL ONCE
Status: DISCONTINUED | OUTPATIENT
Start: 2023-10-09 | End: 2023-10-09 | Stop reason: HOSPADM

## 2023-10-09 RX ORDER — ONDANSETRON 2 MG/ML
4 INJECTION INTRAMUSCULAR; INTRAVENOUS EVERY 6 HOURS PRN
Status: DISCONTINUED | OUTPATIENT
Start: 2023-10-09 | End: 2023-10-09

## 2023-10-09 RX ORDER — METOCLOPRAMIDE HYDROCHLORIDE 5 MG/ML
INJECTION INTRAMUSCULAR; INTRAVENOUS AS NEEDED
Status: DISCONTINUED | OUTPATIENT
Start: 2023-10-09 | End: 2023-10-09 | Stop reason: SURG

## 2023-10-09 RX ORDER — HYDROCODONE BITARTRATE AND ACETAMINOPHEN 5; 325 MG/1; MG/1
1 TABLET ORAL ONCE AS NEEDED
Status: DISCONTINUED | OUTPATIENT
Start: 2023-10-09 | End: 2023-10-09

## 2023-10-09 RX ORDER — PROCHLORPERAZINE EDISYLATE 5 MG/ML
5 INJECTION INTRAMUSCULAR; INTRAVENOUS EVERY 8 HOURS PRN
Status: DISCONTINUED | OUTPATIENT
Start: 2023-10-09 | End: 2023-10-09

## 2023-10-09 RX ORDER — KETOROLAC TROMETHAMINE 30 MG/ML
INJECTION, SOLUTION INTRAMUSCULAR; INTRAVENOUS AS NEEDED
Status: DISCONTINUED | OUTPATIENT
Start: 2023-10-09 | End: 2023-10-09 | Stop reason: SURG

## 2023-10-09 RX ORDER — PHENAZOPYRIDINE HYDROCHLORIDE 100 MG/1
100 TABLET, FILM COATED ORAL 3 TIMES DAILY PRN
Qty: 10 TABLET | Refills: 0 | Status: SHIPPED | OUTPATIENT
Start: 2023-10-09 | End: 2023-10-18 | Stop reason: ALTCHOICE

## 2023-10-09 RX ORDER — ACETAMINOPHEN 500 MG
1000 TABLET ORAL ONCE AS NEEDED
Status: DISCONTINUED | OUTPATIENT
Start: 2023-10-09 | End: 2023-10-09

## 2023-10-09 RX ADMIN — CEFAZOLIN SODIUM/WATER 2 G: 2 G/20 ML SYRINGE (ML) INTRAVENOUS at 07:17:00

## 2023-10-09 RX ADMIN — METOCLOPRAMIDE HYDROCHLORIDE 10 MG: 5 INJECTION INTRAMUSCULAR; INTRAVENOUS at 07:08:00

## 2023-10-09 RX ADMIN — KETOROLAC TROMETHAMINE 30 MG: 30 INJECTION, SOLUTION INTRAMUSCULAR; INTRAVENOUS at 07:56:00

## 2023-10-09 RX ADMIN — LIDOCAINE HYDROCHLORIDE 100 MG: 10 INJECTION, SOLUTION EPIDURAL; INFILTRATION; INTRACAUDAL; PERINEURAL at 07:12:00

## 2023-10-09 RX ADMIN — ONDANSETRON 4 MG: 2 INJECTION INTRAMUSCULAR; INTRAVENOUS at 07:08:00

## 2023-10-09 RX ADMIN — SODIUM CHLORIDE, SODIUM LACTATE, POTASSIUM CHLORIDE, CALCIUM CHLORIDE: 600; 310; 30; 20 INJECTION, SOLUTION INTRAVENOUS at 07:08:00

## 2023-10-09 RX ADMIN — EPHEDRINE SULFATE 5 MG: 50 INJECTION INTRAVENOUS at 07:40:00

## 2023-10-09 RX ADMIN — SODIUM CHLORIDE, SODIUM LACTATE, POTASSIUM CHLORIDE, CALCIUM CHLORIDE: 600; 310; 30; 20 INJECTION, SOLUTION INTRAVENOUS at 07:38:00

## 2023-10-09 NOTE — ANESTHESIA PROCEDURE NOTES
Airway  Date/Time: 10/9/2023 7:12 AM  Urgency: elective    Airway not difficult    General Information and Staff    Patient location during procedure: OR  Anesthesiologist: Rylie Lin MD  Performed: anesthesiologist   Performed by: Rylie Lin MD  Authorized by: Fang Silveira MD      Indications and Patient Condition  Indications for airway management: anesthesia  Spontaneous Ventilation: absent  Sedation level: deep  Preoxygenated: yes  Patient position: sniffing  Mask difficulty assessment: 0 - not attempted    Final Airway Details  Final airway type: supraglottic airway      Successful airway: classic  Size 3       Number of attempts at approach: 1  Number of other approaches attempted: 0

## 2023-10-09 NOTE — DISCHARGE INSTRUCTIONS
You had cystoscopy, ureteroscopy, and stent placement in the operating room today. Instructions:    - No heavy lifting or strenuous activity for 1 day. You may resume regular activity tomorrow. - The office will contact you to make your post-operative clinic appointment in 1-2 weeks. If you do not hear from the clinic after a few days or you need to change your appointment time or date, please contact us at [phone]. You have a stent (small plastic tube) inside your kidney and ureter to allow the swelling from surgery to resolve. This is only temporary and must be removed, so you should not forget about it. We will remove your stent via a brief cystoscopy in clinic when you return. If you have to miss this appointment for some reason please make sure you re-schedule it. - With a ureteral stent in place you may have some discomfort on your side/flank. You can feel pain worsen when you urinate, so try to avoid holding urine and void every 2-3 hours. You also may notice increased blood in the urine as you increase your activity. If this happens increase your hydration and take it easy for a day. Warm baths/hot packs can help with the discomfort as well as your prescribed medications and Advil/Motrin (if you are able to take these). - You may experience mild pain after the procedure for a few days. If the pain becomes intolerable please contact our office or go to the nearest Emergency Room or Urgent Care. You should take over the counter ibuprofen (AKA motrin, advil) for mild pain (provided you do not have a medical condition such as stomach ulcers or kidney disease which prohibits you from taking these). You may alternate this with tylenol as well. If pain is still not relieved by tylenol and/or ibuprofen, you may take narcotic pain medication if prescribed (typically oxycodone or tramadol).  If you are taking narcotic pain medication this can make you constipated, so you should take over the counter stool softeners or miralax if prescribed. - Warm pack or hot baths often help with discomfort after cystoscopy. - If you take blood thinners (such as aspirin or plavix) please hold these medications until 3 days after surgery.     - You may experience burning and frequency of urination over the next few days. This will improve after a few days if you stay well hydrated. If you were prescribed phenazopyridine (Pyridium) this may relieve urinary discomfort but you can only take this for 3 days. Pyridium will make your urine orange. - You are likely to see some blood in your urine (pink or light red urine) that should clear up within a few days. Staying well hydrated should help this clear up. If you notice the urine stays dark red or there are multiple large blood clots despite good hydration, please call the urology clinic ([phone]). - Try to abstain from alcohol, coffee, tea, artificial sweeteners, and spicy food for the next 48 hours as these can irritate the bladder.     - If you develop fevers / chills, difficulty urinating, or abdominal pain that does not improve with pain medications, please call the office.     - Drink 1.5 to 2 liters of fluid today (water is preferable). If you are on a fluid restriction due to other medical reasons then you need to adhere to your fluid restriction recommendations.       Gorge Wu MD  Staff Urologist  [facility]  Office: [phone]

## 2023-10-09 NOTE — OPERATIVE REPORT
UROLOGY OPERATIVE NOTE  DATE: 10/9/2023    PATIENT: Gary House  AGE: 41year old  SEX: male  MRN: [id number]  YOB: 1980    PROCEDURE:  1. Cystoscopy, rigid (72783)  2. right retrograde pyelogram (27257)  3. right ureteroscopy with laser lithotripsy (64769)  4. Stone basketing (11568)  5. right ureteral stent placement ([id number])    SURGEON: Kathrin Mcardle , MD      PREOPERATIVE DIAGNOSIS: right nephrolithiasis    POSTOPERATIVE DIAGNOSIS: Same    FINDINGS:   Normal urethra. Non enlarged prostate. Bladder without lesions. Right RPG without significant hydronephrosis; distal ureteral filling defect. Rigid ureteroscopy with ~7mm stone; laser fragmented and extracted. Flexible ureteroscopy with small 3mm mid-pole stone, basket extracted. Rigth 6x26 JJ stent placed under visual and fluoroscopic guidance. ANESTHESIA: General    BLOOD LOSS: Minimal    COMPLICATIONS: None    DRAINS: Right 6x26 JJ stent     IMPLANTS:  Implant Name Type Inv. Item Serial No.  Lot No. LRB No. Used Action   ASCERTA STENT URET [id number] - [facility]  ASCERTA Brena Mane [id number]  [employer] [place] [id number] Right 1 Implanted        SPECIMENS: Right kidney stone     INDICATIONS FOR OPERATIVE PROCEDURE:    Patient is a 41year old male with seasonal allergies who presents for management of right kidney stones. Prior kidney stones 5-6 years ago, spontaneous passage. Prior ureteroscopy 4-5 years. Unsure of stone composition. No prior 24 hour urine collection. Patient presented to the [facility] 8/27/23 for right flank pain. CT scan was obtained that showed 7 mm distal right ureteral stone with moderate hydronephrosis and additional 4 mm right non obstructing stone and left 2 mm non obstructing stone. UA non infectious. Labs normal white count and serum creatinine 1.20 mg/dL. Was on tamsulosin 0.4mg for approximately one week. Patient continues to have intermittent pain, more into the abdomen currently. Stopped straining after 2 weeks. Increased urinary frequency, urgency, weak urine stream. No dysuria or gross hematuria. No fevers. Repeat CT scan was done on 10/6; Angelica You has progresed distally but remains in the distal ureter near the UVJ. He presents now for management. I recommended proceeding with ureteroscopy with laser lithotripsy. The risks discussed included, but were not limited to, urinary tract infection, sepsis, bleeding, injury to urethra/bladder/ureter, urethra stricture, ureteral stricture, inability to treat stone. Patient agreeable to proceed and informed consent obtained. DESCRIPTION OF OPERATIVE PROCEDURE:  Prior to induction of anesthesia the patient was placed in bilateral stocking compression devices as well as given preoperative IV antibiotics. Once adequate anesthesia was obtained, he was positioned in dorsal lithotomy position with all pressure points padded. Genitals were prepped and draped in standard sterile fashion. A surgical timeout was performed confirming patient identification and procedure. A 21-Nauruan scope and 30-degree lens was inserted in the patient's urethra and advanced towards his bladder. Systematic inspection of the bladder revealed no bladder mass, lesions, or stones. We turned our attention to the right ureteral orifice and performed a retrograde pyelogram  by cannulating the ureteral orifice with a 5-Nauruan ureteral catheter. Contrast dye was slowly injected. A sensor guidewire was placed through the ureteral catheter and into the patient's right renal pelvis. Position was confirmed with fluoroscopy. Cystoscope and ureteral catheter were removed. We then inserted our semi-rigid ureteroscope and cannulated the right ureter without difficulty. We passed the semi-rigid ureteroscope to the level of the stone at disstal ureter where a ~7mm stone was seen. A 200 micron laser fiber was used and stone fragmented and dusted at a low power setting.    There were a few small fragments that were removed using a ZeroTip nitinol basket and these were sent for chemical analysis. The ureteroscope was passed to the level of the ureteropelvic junction and no other stones were noted. A second sensor wire was then placed via the semi-rigid ureteroscope and into the renal pelvis. I then placed a flexible ureteroscope over one of the wires and into the renal pelvis under flouroscopy. Pan nephroscopy revealed a small 3 mm stone in the interpolar region but no further stones. This stone was basket extracted. We then removed our ureteroscope, making sure to inspect the ureter on the way out. There was no damage to the ureter. We then placed a 6x26 JJ  ureteral stent over our safety wire using a Seldinger technique. Stent left without strings. All stone fragments were drained from bladder. The procedure was then terminated. The patient tolerated the procedure well with no immediate complications. All sponge and instrument counts were correct at the end of the case. Patient was extubated and transferred to the PACU in stable condition. I was present and scrubbed for entirety of the procedure.      DISPOSITION:  - discharge to home after recovery in PACU    - return to clinic in 2 weeks for cysto/stent removal  - return to clinic in 6 weeks with RBUS prior and to review chemical stone analysis       Lobito Rahman MD  Staff Urologist  [place] [address]  Office: [phone]

## 2023-10-10 ENCOUNTER — TELEPHONE (OUTPATIENT)
Dept: SURGERY | Facility: CLINIC | Age: 43
End: 2023-10-10

## 2023-10-11 NOTE — TELEPHONE ENCOUNTER
Received call from patient that he has been having worsening bladder spasms throughout the day; especially over the past hour. He has tried oxybutynin. Missed 1 dose of Flomax but took today. No fevers, chills, nausea, vomiting. Has been voiding throughout the day. No significant hematuria. Main sensation is crampy suprapubic pain with urgency. Recommend continued medications with oxybutynin, flomax, pyridium. Recommend warm baths. Can take tylenol and ibuprofen alternating. Stay hydrated. Bowel regimen. Discussed come to ED with any severe pain, systemic symptoms. He will call our clinic tomorrow and update.        Francisco Willis MD  Staff Urologist  [place] [address]  Office: [phone]

## 2023-10-17 LAB
CAOX DIHYDRATE: 100 %
WEIGHT-STONE: 18 MG

## 2023-10-18 ENCOUNTER — PROCEDURE (OUTPATIENT)
Dept: SURGERY | Facility: CLINIC | Age: 43
End: 2023-10-18

## 2023-10-18 VITALS — HEART RATE: 69 BPM | SYSTOLIC BLOOD PRESSURE: 147 MMHG | DIASTOLIC BLOOD PRESSURE: 112 MMHG

## 2023-10-18 DIAGNOSIS — N20.0 NEPHROLITHIASIS: Primary | ICD-10-CM

## 2023-10-18 LAB
APPEARANCE: CLEAR
BILIRUBIN: NEGATIVE
GLUCOSE (URINE DIPSTICK): NEGATIVE MG/DL
KETONES (URINE DIPSTICK): NEGATIVE MG/DL
MULTISTIX LOT#: ABNORMAL NUMERIC
NITRITE, URINE: NEGATIVE
PH, URINE: 5.5 (ref 4.5–8)
PROTEIN (URINE DIPSTICK): >=300 MG/DL
SPECIFIC GRAVITY: 1.02 (ref 1–1.03)
UROBILINOGEN,SEMI-QN: 0.2 MG/DL (ref 0–1.9)

## 2023-10-18 PROCEDURE — 3080F DIAST BP >= 90 MM HG: CPT | Performed by: UROLOGY

## 2023-10-18 PROCEDURE — 52310 CYSTOSCOPY AND TREATMENT: CPT | Performed by: UROLOGY

## 2023-10-18 PROCEDURE — 81003 URINALYSIS AUTO W/O SCOPE: CPT | Performed by: UROLOGY

## 2023-10-18 PROCEDURE — 3077F SYST BP >= 140 MM HG: CPT | Performed by: UROLOGY

## 2023-10-18 RX ORDER — CIPROFLOXACIN 500 MG/1
500 TABLET, FILM COATED ORAL ONCE
Status: COMPLETED | OUTPATIENT
Start: 2023-10-18 | End: 2023-10-18

## 2023-10-18 RX ADMIN — CIPROFLOXACIN 500 MG: 500 TABLET, FILM COATED ORAL at 11:21:00

## 2023-10-18 NOTE — PROGRESS NOTES
Clinic Procedure Note    INDICATIONS:     Patient is a 41year old male with seasonal allergies with nephrolithiasis sp right sided ureteroscopy 10/9/23 now here for stent removal.      Prior kidney stones 5-6 years ago, spontaneous passage. Prior ureteroscopy 4-5 years. Unsure of stone composition. No prior 24 hour urine collection. Patient presented to the [facility] 23 for right flank pain. CT scan was obtained that showed 7 mm distal right ureteral stone with moderate hydronephrosis and additional 4 mm right non obstructing stone and left 2 mm non obstructing stone. UA non infectious. Labs normal white count and serum creatinine 1.20 mg/dL. Repeat CT scan was done on 10/6; Aliya Orellana has progresed distally but remains in the distal ureter near the UVJ. He then presented for management. Surgery Performed:  Cystoscopy, right RPG/URS/Hll/stent     Operative Findings:  Normal urethra. Non enlarged prostate. Bladder without lesions. Right RPG without significant hydronephrosis; distal ureteral filling defect. Rigid ureteroscopy with ~7mm stone; laser fragmented and extracted. Flexible ureteroscopy with small 3mm mid-pole stone, basket extracted. Rigth 6x26 JJ stent placed under visual and fluoroscopic guidance. Stone analysis:  CaOxD 100%    Doing well today. Also has tiny left sided stones that we are observing. PROCEDURE:       1. Flexible cystoscopy, removal of right ureteral stent (55312)    DATE OF PROCEDURE: 10/17/2023     PRE-PROCEDURE DIAGNOSIS: Nephrolithiasis    POST-PROCEDURE DIAGNOSIS: Same     SURGEON: Vida Nuñez MD    FINDINGS:  Stent successfully removed in its entirety. PROCEDURE:   Patient was brought to the procedure suite and a time-out was performed identifiying the patient,  and procedure to be performed. The risks and benefits of the procedure were once again discussed with the patient including bleeding, infection, and dysuria. The patient agreed to proceed.  The patient did not have any signs or symptoms of active UTI. Prophylactic PO antibiotics were given in clinic. The patient was placed in supine position on the table and the genital area was prepped and draped in the standard sterile fashion. Wilnette Merl was used prior for local anesthesia effect. A flexible cystoscope was inserted per urethra. There were no urethral strictures present. The bladder was entered and the distal coil of the stent was seen protruding from the ureteral orifice. The stent was grasped with the flexible stent grasper, and then the stent and cystoscope were both removed. The stent was visualized outside the body and confirmed to be intact and fully removed. There were no complications and the patient tolerated the procedure well. IMPRESSION:  Successful stent removal after ureteroscopy today.     PLAN:   -Renal/bladder ultrasound in 6 weeks to ensure no silent hydronephrosis  -24 hour urine study for metabolic workup and serum studies- at least 3/4 weeks from now       Frederick Kayser, MD  Staff Urologist  [facility]  Office: [phone]

## 2023-10-26 ENCOUNTER — TELEPHONE (OUTPATIENT)
Dept: SURGERY | Facility: CLINIC | Age: 43
End: 2023-10-26

## 2023-11-01 RX ORDER — TAMSULOSIN HYDROCHLORIDE 0.4 MG/1
CAPSULE ORAL
Qty: 90 CAPSULE | Refills: 0 | Status: SHIPPED | OUTPATIENT
Start: 2023-11-01

## 2023-12-16 ENCOUNTER — HOSPITAL ENCOUNTER (OUTPATIENT)
Age: 43
Discharge: HOME | End: 2023-12-16
Payer: COMMERCIAL

## 2023-12-16 ENCOUNTER — APPOINTMENT (OUTPATIENT)
Dept: GENERAL RADIOLOGY | Age: 43
End: 2023-12-16
Attending: PHYSICIAN ASSISTANT
Payer: COMMERCIAL

## 2023-12-16 VITALS
BODY MASS INDEX: 27.47 KG/M2 | HEART RATE: 80 BPM | RESPIRATION RATE: 20 BRPM | WEIGHT: 175 LBS | TEMPERATURE: 98 F | OXYGEN SATURATION: 95 % | SYSTOLIC BLOOD PRESSURE: 139 MMHG | DIASTOLIC BLOOD PRESSURE: 101 MMHG | HEIGHT: 67 IN

## 2023-12-16 DIAGNOSIS — Z20.822 ENCOUNTER FOR LABORATORY TESTING FOR COVID-19 VIRUS: ICD-10-CM

## 2023-12-16 DIAGNOSIS — H10.32 ACUTE CONJUNCTIVITIS OF LEFT EYE, UNSPECIFIED ACUTE CONJUNCTIVITIS TYPE: ICD-10-CM

## 2023-12-16 DIAGNOSIS — J20.8 VIRAL BRONCHITIS: ICD-10-CM

## 2023-12-16 DIAGNOSIS — R50.9 FEVER: Primary | ICD-10-CM

## 2023-12-16 LAB
POCT INFLUENZA A: NEGATIVE
POCT INFLUENZA B: NEGATIVE
S PYO AG THROAT QL: NEGATIVE
SARS-COV-2 RNA RESP QL NAA+PROBE: NOT DETECTED

## 2023-12-16 PROCEDURE — 87502 INFLUENZA DNA AMP PROBE: CPT | Performed by: PHYSICIAN ASSISTANT

## 2023-12-16 PROCEDURE — 71046 X-RAY EXAM CHEST 2 VIEWS: CPT | Performed by: PHYSICIAN ASSISTANT

## 2023-12-16 PROCEDURE — 99214 OFFICE O/P EST MOD 30 MIN: CPT | Performed by: PHYSICIAN ASSISTANT

## 2023-12-16 PROCEDURE — U0002 COVID-19 LAB TEST NON-CDC: HCPCS | Performed by: PHYSICIAN ASSISTANT

## 2023-12-16 PROCEDURE — 87880 STREP A ASSAY W/OPTIC: CPT | Performed by: PHYSICIAN ASSISTANT

## 2023-12-16 RX ORDER — BENZONATATE 200 MG/1
200 CAPSULE ORAL 3 TIMES DAILY PRN
Qty: 30 CAPSULE | Refills: 0 | Status: SHIPPED | OUTPATIENT
Start: 2023-12-16 | End: 2023-12-23

## 2023-12-16 RX ORDER — PREDNISONE 20 MG/1
40 TABLET ORAL DAILY
Qty: 10 TABLET | Refills: 0 | Status: SHIPPED | OUTPATIENT
Start: 2023-12-16 | End: 2023-12-21

## 2023-12-16 RX ORDER — POLYMYXIN B SULFATE AND TRIMETHOPRIM 1; 10000 MG/ML; [USP'U]/ML
1 SOLUTION OPHTHALMIC
Qty: 10 ML | Refills: 0 | Status: SHIPPED | OUTPATIENT
Start: 2023-12-16 | End: 2023-12-21

## 2023-12-16 RX ORDER — ALBUTEROL SULFATE 90 UG/1
2 AEROSOL, METERED RESPIRATORY (INHALATION) EVERY 4 HOURS PRN
Qty: 1 EACH | Refills: 0 | Status: SHIPPED | OUTPATIENT
Start: 2023-12-16 | End: 2024-01-15

## 2023-12-16 NOTE — ED INITIAL ASSESSMENT (HPI)
Pt presents with sore throat, cough, congestion low grade fever x 2 days. Pt reports difficulty swallowing. Left eye with redness and drainage.

## 2024-01-11 ENCOUNTER — APPOINTMENT (OUTPATIENT)
Dept: GENERAL RADIOLOGY | Age: 44
End: 2024-01-11
Attending: PHYSICIAN ASSISTANT
Payer: COMMERCIAL

## 2024-01-11 ENCOUNTER — HOSPITAL ENCOUNTER (OUTPATIENT)
Age: 44
Discharge: HOME | End: 2024-01-11
Payer: COMMERCIAL

## 2024-01-11 VITALS
SYSTOLIC BLOOD PRESSURE: 124 MMHG | BODY MASS INDEX: 28.93 KG/M2 | OXYGEN SATURATION: 97 % | HEART RATE: 108 BPM | HEIGHT: 66 IN | DIASTOLIC BLOOD PRESSURE: 86 MMHG | WEIGHT: 180 LBS | TEMPERATURE: 97 F | RESPIRATION RATE: 20 BRPM

## 2024-01-11 DIAGNOSIS — R05.1 ACUTE COUGH: ICD-10-CM

## 2024-01-11 DIAGNOSIS — J02.0 STREPTOCOCCAL SORE THROAT: Primary | ICD-10-CM

## 2024-01-11 DIAGNOSIS — R07.81 RIB PAIN ON RIGHT SIDE: ICD-10-CM

## 2024-01-11 LAB
POCT INFLUENZA A: NEGATIVE
POCT INFLUENZA B: NEGATIVE
S PYO AG THROAT QL: POSITIVE
SARS-COV-2 RNA RESP QL NAA+PROBE: NOT DETECTED

## 2024-01-11 PROCEDURE — 87502 INFLUENZA DNA AMP PROBE: CPT | Performed by: PHYSICIAN ASSISTANT

## 2024-01-11 PROCEDURE — U0002 COVID-19 LAB TEST NON-CDC: HCPCS | Performed by: PHYSICIAN ASSISTANT

## 2024-01-11 PROCEDURE — 71101 X-RAY EXAM UNILAT RIBS/CHEST: CPT | Performed by: PHYSICIAN ASSISTANT

## 2024-01-11 PROCEDURE — 99214 OFFICE O/P EST MOD 30 MIN: CPT | Performed by: PHYSICIAN ASSISTANT

## 2024-01-11 PROCEDURE — 87880 STREP A ASSAY W/OPTIC: CPT | Performed by: PHYSICIAN ASSISTANT

## 2024-01-11 RX ORDER — DEXAMETHASONE 4 MG/1
8 TABLET ORAL ONCE
Status: COMPLETED | OUTPATIENT
Start: 2024-01-11 | End: 2024-01-11

## 2024-01-11 RX ORDER — AMOXICILLIN 875 MG/1
875 TABLET, COATED ORAL 2 TIMES DAILY
Qty: 20 TABLET | Refills: 0 | Status: SHIPPED | OUTPATIENT
Start: 2024-01-11 | End: 2024-01-21

## 2024-01-11 NOTE — DISCHARGE INSTRUCTIONS
Increase fluid and rest   Take antibiotic as directed until gone  new toothbrush in 24 hours  follow up with pediatrician in 48 hours  return to the UC if symptoms worsen

## 2024-01-11 NOTE — ED INITIAL ASSESSMENT (HPI)
Pt c/o fever, sore throat, body ache and cough. Pt states his symptoms started last night. Pt states his son is flu positive.

## 2024-01-11 NOTE — ED PROVIDER NOTES
Patient Seen in: [facility]      History     Chief Complaint   Patient presents with    Cough/URI    Fever    Sore Throat    Body ache and/or chills     Stated Complaint: body aches    Subjective:   The history is provided by the patient.       42 yo male with no significant PMH presents to the  due to fevers, Tmax of 101, which improves with ibuprofen. Associated body aches and chills.  Also endorsing nasal congestion and dry cough.  Mild sore throat without trismus drooling or muffled voice.  Positive for influenza last week and concerned he is having the same symptoms.     Pt Is also mentioned right rib pain intermittently for the past month.  Occurs after certain movements of the right arm during which he will get a \"spasm\" in his right ribs, reproducible.  Denies any chest pain shortness of breath palpitations.  The pain is reproducible with movement of the right arm and palpation.  No direct injury or trauma but pain increased a few days after shoveling snow. \"I believe I pulled a muslce\"  Taking ibuprofen with some relief.  Pain will also improve with certain position changes. Cannot recall a direct injury or trauma.  Right-hand-dominant.    Objective:   Past Medical History:   Diagnosis Date    Calculus of kidney     Sleep apnea     no equipment              Past Surgical History:   Procedure Laterality Date    REMOVAL GALLBLADDER      REMOVAL OF KIDNEY STONE                  Social History     Socioeconomic History    Marital status:    Tobacco Use    Smoking status: Every Day     Packs/day: .5     Types: Cigarettes    Smokeless tobacco: Never   Vaping Use    Vaping Use: Never used   Substance and Sexual Activity    Alcohol use: Yes     Alcohol/week: 1.0 standard drink of alcohol     Types: 1 Standard drinks or equivalent per week    Drug use: Yes     Types: Cannabis              Review of Systems   Constitutional:  Positive for chills, fatigue and fever.   HENT:  Positive  for congestion and sore throat. Negative for tinnitus, trouble swallowing and voice change.    Respiratory:  Positive for cough. Negative for shortness of breath, wheezing and stridor.    Cardiovascular: Negative.    Gastrointestinal: Negative.    Musculoskeletal: Negative.        Positive for stated complaint: body aches  Other systems are as noted in HPI.  Constitutional and vital signs reviewed.      All other systems reviewed and negative except as noted above.    Physical Exam     ED Triage Vitals [01/11/24 0839]   BP (!) 121/94   Pulse 120   Resp 22   Temp 99 °F (37.2 °C)   Temp src Oral   SpO2 98 %   O2 Device None (Room air)       Current:/86   Pulse 108   Temp 97.3 °F (36.3 °C)   Resp 20   Ht 167.6 cm (5' 6\")   Wt 81.6 kg   SpO2 97%   BMI 29.05 kg/m²         Physical Exam  Vitals and nursing note reviewed.   Constitutional:       General: He is not in acute distress.     Appearance: He is well-developed. He is not toxic-appearing.   HENT:      Head: Normocephalic.      Right Ear: Tympanic membrane and ear canal normal.      Left Ear: Tympanic membrane and ear canal normal.      Nose: Congestion present.      Mouth/Throat:      Pharynx: Posterior oropharyngeal erythema present. No pharyngeal swelling, oropharyngeal exudate or uvula swelling.      Tonsils: No tonsillar exudate or tonsillar abscesses. 1+ on the right. 1+ on the left.   Eyes:      Conjunctiva/sclera: Conjunctivae normal.      Pupils: Pupils are equal, round, and reactive to light.   Cardiovascular:      Rate and Rhythm: Normal rate and regular rhythm.      Comments: Producible tenderness over the right lateral ribs 6 through 7.  No ecchymosis.  No edema.  No flail chest.  No crepitus.  No vesicular rash.  Pulmonary:      Effort: Pulmonary effort is normal.      Breath sounds: Normal breath sounds.   Musculoskeletal:      Cervical back: Normal range of motion.   Lymphadenopathy:      Cervical: Cervical adenopathy present.   Skin:      General: Skin is warm.   Neurological:      General: No focal deficit present.      Mental Status: He is alert and oriented to person, place, and time.   Psychiatric:         Mood and Affect: Mood normal.         Behavior: Behavior normal.               ED Course     Labs Reviewed   POCT RAPID STREP - Abnormal; Notable for the following components:       Result Value    POCT Rapid Strep Positive (*)     All other components within normal limits   POCT FLU TEST - Normal    Narrative:     This assay is a rapid molecular in vitro test utilizing nucleic acid amplification of influenza A and B viral RNA.   RAPID SARS-COV-2 BY PCR - Normal                      MDM   43-year-old male presents to the urgent care due to 2 days of fevers, body aches, chills.  Also endorsing nasal congestion and dry cough.  Mild sore throat without trismus drooling or muffled voice. Son At home last week with similar symptoms.  Also complaining of localized right lateral rib pain intermittently for the past month.  Cannot recall direct injury or trauma but with certain movements, activities and palpation he will have right rib pain.  Denies any chest pain shortness of breath.  This is not exertional no nausea or vomiting.  Taking ibuprofen with some relief.  Over the last few days and believes he made it worse after he shoveled snow this week.  During the shoveling patient was having no chest pain, shortness of breath, NV.      On exam the patient is low-grade fever but nontoxic.  Tolerating fluids.  Posterior pharynx with significant erythema 2+ tonsillar edema with exudate.  No concern for peritonsillar abscess.  Lungs clear to auscultation.  Does have reproducible tenderness over ribs 6 and 7 no vesicular rash.  Heart lungs are clear.  Influenza COVID-negative.  Rapid strep positive will start on amoxicillin.  Will give a one-time dose of Decadron prior to discharge due to the swelling of his throat.  Rib x-ray was performed showing no  acute findings.  This exam is consistent with musculoskeletal etiology.  I discussed at length with the patient and asked if he was having chest pain shortness of breath pleuritic pain exertional dyspnea lower leg swelling immobilization history of clots which patient denies.  This pain is all reproducible with movement of the right arm and palpation which is Reproducible on exam in the right lateral ribs.  Clinically I have no concern for ACS nor PE at this time however strict return precautions were discussed at length with the patient who voiced understanding.                              Medical Decision Making  Problems Addressed:  Rib pain on right side: acute illness or injury  Streptococcal sore throat: acute illness or injury    Amount and/or Complexity of Data Reviewed  Labs: ordered. Decision-making details documented in ED Course.  Radiology: ordered. Decision-making details documented in ED Course.    Risk  OTC drugs.  Prescription drug management.        Disposition and Plan     Clinical Impression:  1. Streptococcal sore throat    2. Acute cough    3. Rib pain on right side         Disposition:  Discharge  1/11/2024 10:05 am    Follow-up:  No follow-up provider specified.        Medications Prescribed:  Current Discharge Medication List        START taking these medications    Details   amoxicillin 875 MG Oral Tab Take 1 tablet (875 mg total) by mouth 2 (two) times daily for 10 days.  Qty: 20 tablet, Refills: 0

## 2024-08-08 ENCOUNTER — HOSPITAL ENCOUNTER (OUTPATIENT)
Age: 44
Discharge: HOME | End: 2024-08-08
Payer: COMMERCIAL

## 2024-08-08 ENCOUNTER — APPOINTMENT (OUTPATIENT)
Dept: GENERAL RADIOLOGY | Age: 44
End: 2024-08-08
Attending: NURSE PRACTITIONER
Payer: COMMERCIAL

## 2024-08-08 VITALS
OXYGEN SATURATION: 99 % | DIASTOLIC BLOOD PRESSURE: 84 MMHG | TEMPERATURE: 98 F | SYSTOLIC BLOOD PRESSURE: 144 MMHG | HEART RATE: 87 BPM | RESPIRATION RATE: 18 BRPM

## 2024-08-08 DIAGNOSIS — H66.93 BILATERAL OTITIS MEDIA, UNSPECIFIED OTITIS MEDIA TYPE: Primary | ICD-10-CM

## 2024-08-08 DIAGNOSIS — J02.0 STREPTOCOCCAL SORE THROAT: ICD-10-CM

## 2024-08-08 LAB
S PYO AG THROAT QL: POSITIVE
SARS-COV-2 RNA RESP QL NAA+PROBE: NOT DETECTED

## 2024-08-08 PROCEDURE — U0002 COVID-19 LAB TEST NON-CDC: HCPCS | Performed by: NURSE PRACTITIONER

## 2024-08-08 PROCEDURE — 71046 X-RAY EXAM CHEST 2 VIEWS: CPT | Performed by: NURSE PRACTITIONER

## 2024-08-08 PROCEDURE — 94640 AIRWAY INHALATION TREATMENT: CPT | Performed by: NURSE PRACTITIONER

## 2024-08-08 PROCEDURE — 87880 STREP A ASSAY W/OPTIC: CPT | Performed by: NURSE PRACTITIONER

## 2024-08-08 PROCEDURE — 99214 OFFICE O/P EST MOD 30 MIN: CPT | Performed by: NURSE PRACTITIONER

## 2024-08-08 RX ORDER — PREDNISONE 20 MG/1
40 TABLET ORAL DAILY
Qty: 10 TABLET | Refills: 0 | Status: SHIPPED | OUTPATIENT
Start: 2024-08-08 | End: 2024-08-13

## 2024-08-08 RX ORDER — ALBUTEROL SULFATE 90 UG/1
2 AEROSOL, METERED RESPIRATORY (INHALATION) EVERY 4 HOURS PRN
Qty: 1 EACH | Refills: 0 | Status: SHIPPED | OUTPATIENT
Start: 2024-08-08 | End: 2024-09-07

## 2024-08-08 RX ORDER — IPRATROPIUM BROMIDE AND ALBUTEROL SULFATE 2.5; .5 MG/3ML; MG/3ML
3 SOLUTION RESPIRATORY (INHALATION) ONCE
Status: COMPLETED | OUTPATIENT
Start: 2024-08-08 | End: 2024-08-08

## 2024-08-08 RX ORDER — BENZONATATE 100 MG/1
100 CAPSULE ORAL 3 TIMES DAILY PRN
Qty: 30 CAPSULE | Refills: 0 | Status: SHIPPED | OUTPATIENT
Start: 2024-08-08 | End: 2024-09-07

## 2024-08-08 RX ORDER — AMOXICILLIN AND CLAVULANATE POTASSIUM 875; 125 MG/1; MG/1
1 TABLET, FILM COATED ORAL 2 TIMES DAILY
Qty: 20 TABLET | Refills: 0 | Status: SHIPPED | OUTPATIENT
Start: 2024-08-08 | End: 2024-08-18

## 2024-08-08 RX ORDER — FLUTICASONE PROPIONATE 50 MCG
SPRAY, SUSPENSION (ML) NASAL DAILY
COMMUNITY

## 2024-08-08 NOTE — ED PROVIDER NOTES
Patient Seen in: Immediate [facility]      History     Chief Complaint   Patient presents with    Cough/URI     Stated Complaint: sore throat  x7 days    Subjective:   HPI  44-year-old male with kidney stones and sleep apnea presents complaining of 1 week of sore throat, congestion, cough, and ear pain.  He denies any fever.    Objective:   Past Medical History:    Calculus of kidney    Sleep apnea    no equipment              Past Surgical History:   Procedure Laterality Date    Removal gallbladder      Removal of kidney stone                  Social History     Socioeconomic History    Marital status:    Tobacco Use    Smoking status: Every Day     Current packs/day: 0.50     Types: Cigarettes    Smokeless tobacco: Never   Vaping Use    Vaping status: Never Used   Substance and Sexual Activity    Alcohol use: Yes     Alcohol/week: 1.0 standard drink of alcohol     Types: 1 Standard drinks or equivalent per week    Drug use: Yes     Types: Cannabis              Review of Systems   All other systems reviewed and are negative.      Positive for stated Chief Complaint: Cough/URI    Other systems are as noted in HPI.  Constitutional and vital signs reviewed.      All other systems reviewed and negative except as noted above.    Physical Exam     ED Triage Vitals [08/08/24 1045]   /84   Pulse 87   Resp 18   Temp 98.4 °F (36.9 °C)   Temp src Oral   SpO2 99 %   O2 Device None (Room air)       Current Vitals:   Vital Signs  BP: 144/84  Pulse: 87  Resp: 18  Temp: 98.4 °F (36.9 °C)  Temp src: Oral    Oxygen Therapy  SpO2: 99 %  O2 Device: None (Room air)            Physical Exam  Vitals and nursing note reviewed.   Constitutional:       General: He is not in acute distress.     Appearance: He is well-developed. He is not ill-appearing or toxic-appearing.   HENT:      Ears:      Comments: Bilateral TM erythema and swelling     Nose: Congestion present. No rhinorrhea.      Mouth/Throat:      Pharynx:  Posterior oropharyngeal erythema present. No oropharyngeal exudate.      Comments: Tonsils 1+ bilaterally with exudate.  No trismus.  Uvula midline without any swelling.  Cardiovascular:      Rate and Rhythm: Normal rate and regular rhythm.      Heart sounds: Normal heart sounds.   Pulmonary:      Effort: Pulmonary effort is normal. No respiratory distress.      Breath sounds: Wheezing present.   Skin:     General: Skin is warm and dry.   Neurological:      Mental Status: He is alert and oriented to person, place, and time.               ED Course     Labs Reviewed   POCT RAPID STREP - Abnormal; Notable for the following components:       Result Value    POCT Rapid Strep Positive (*)     All other components within normal limits   RAPID SARS-COV-2 BY PCR - Normal             XR CHEST PA + LAT CHEST (CPT=71046)    Result Date: 8/8/2024  PROCEDURE:  XR CHEST PA + LAT CHEST (CPT=71046)  INDICATIONS:  sore throat  x7 days  COMPARISON:  [facility], XR, XR CHEST PA + LAT CHEST (CPT=71046), 12/16/2023, 12:55 PM.  TECHNIQUE:  PA and lateral chest radiographs were obtained.  PATIENT STATED HISTORY: (As transcribed by Technologist)  Pt c/o congestion, cough and sore throat for 7x days    FINDINGS:  Normal heart size and pulmonary vascularity. No pleural effusion or pneumothorax. No lobar consolidation.  Surgical clips in the upper abdomen.            CONCLUSION:  No active cardiopulmonary process identified.   LOCATION:  [id number]   Dictated by (CST): Paul Cooper MD on 8/08/2024 at 11:32 AM     Finalized by (CST): Paul Cooper MD on 8/08/2024 at 11:32 AM             [facility]       Medical Decision Making  44-year-old male with kidney stones and sleep apnea presents complaining of 1 week of sore throat, congestion, cough, and ear pain.  He denies any fever.    Pertinent Labs & Imaging studies reviewed. (See chart for details).  Patient coming in with viral symptoms.   Differential diagnosis includes but not limited to  COVID, strep, bronchitis, pneumonia  Labs reviewed COVID-negative positive strep. Radiology chest x-ray with no consolidation.  Will treat for bilateral otitis media, strep.  Will discharge on Augmentin, prednisone, albuterol, and Tessalon. Patient is comfortable with this plan.    Overall Pt looks good. Non-toxic, well-hydrated and in no respiratory distress. Vital signs are reassuring. Exam is reassuring. I do not believe pt requires and additional diagnostic studies or intervention. I believe pt can be discharged home to continue evaluation as an outpatient.  Wheezes significantly improved after albuterol.  Patient reported feeling much better.  Follow-up provider given. Discharge instructions given and reviewed. Return for any problems. All understand and agree with the plan.        Problems Addressed:  Bilateral otitis media, unspecified otitis media type: acute illness or injury  Streptococcal sore throat: acute illness or injury    Amount and/or Complexity of Data Reviewed  Radiology: ordered and independent interpretation performed.     Details: Chest x-ray ordered and independently interpreted by myself as no consolidation        Disposition and Plan     Clinical Impression:  1. Bilateral otitis media, unspecified otitis media type    2. Streptococcal sore throat         Disposition:  Discharge  8/8/2024 11:36 am    Follow-up:  No follow-up provider specified.        Medications Prescribed:  Discharge Medication List as of 8/8/2024 11:38 AM        START taking these medications    Details   amoxicillin clavulanate 875-125 MG Oral Tab Take 1 tablet by mouth 2 (two) times daily for 10 days., Normal, Disp-20 tablet, R-0      predniSONE 20 MG Oral Tab Take 2 tablets (40 mg total) by mouth daily for 5 days., Normal, Disp-10 tablet, R-0      benzonatate 100 MG Oral Cap Take 1 capsule (100 mg total) by mouth 3 (three) times daily as needed for cough., Normal, Disp-30 capsule, R-0      albuterol 108 (90 Base)  MCG/ACT Inhalation Aero Soln Inhale 2 puffs into the lungs every 4 (four) hours as needed for Wheezing., Normal, Disp-1 each, R-0

## 2024-08-08 NOTE — ED INITIAL ASSESSMENT (HPI)
Pt c/o congestion, cough and sore throat for 7x days, symptoms got worse over past few days, cough has become productive yellow sputum

## 2024-09-15 ENCOUNTER — HOSPITAL ENCOUNTER (OUTPATIENT)
Age: 44
Discharge: HOME | End: 2024-09-15
Payer: COMMERCIAL

## 2024-09-15 VITALS
BODY MASS INDEX: 28.93 KG/M2 | TEMPERATURE: 98 F | WEIGHT: 180 LBS | OXYGEN SATURATION: 98 % | SYSTOLIC BLOOD PRESSURE: 145 MMHG | HEIGHT: 66 IN | DIASTOLIC BLOOD PRESSURE: 99 MMHG | HEART RATE: 78 BPM | RESPIRATION RATE: 18 BRPM

## 2024-09-15 DIAGNOSIS — S05.02XA LEFT CORNEAL ABRASION, INITIAL ENCOUNTER: Primary | ICD-10-CM

## 2024-09-15 PROCEDURE — 99213 OFFICE O/P EST LOW 20 MIN: CPT | Performed by: PHYSICIAN ASSISTANT

## 2024-09-15 RX ORDER — OFLOXACIN 3 MG/ML
SOLUTION/ DROPS OPHTHALMIC
Qty: 1 EACH | Refills: 0 | Status: SHIPPED | OUTPATIENT
Start: 2024-09-15

## 2024-09-15 RX ORDER — TETRACAINE HYDROCHLORIDE 5 MG/ML
1 SOLUTION OPHTHALMIC ONCE
Status: COMPLETED | OUTPATIENT
Start: 2024-09-15 | End: 2024-09-15

## 2024-09-15 NOTE — ED PROVIDER NOTES
Patient Seen in: [facility]mediate [facility]      History     Chief Complaint   Patient presents with    Eye Problem     Stated Complaint: Lt eye pain    Subjective:   HPI    44-year-old male who comes in today complaining of 48 hours of the left eye irritation itching and today woke up with a crusted shut.  Patient wears glasses not contacts.  He currently does not have his glasses with them so that is what is affecting his visual acuity.  Denies blurred vision double vision or photosensitivity    Objective:   Past Medical History:    Calculus of kidney    Sleep apnea    no equipment              Past Surgical History:   Procedure Laterality Date    Removal gallbladder      Removal of kidney stone                  Social History     Socioeconomic History    Marital status:    Tobacco Use    Smoking status: Every Day     Current packs/day: 0.50     Types: Cigarettes    Smokeless tobacco: Never   Vaping Use    Vaping status: Never Used   Substance and Sexual Activity    Alcohol use: Yes     Alcohol/week: 1.0 standard drink of alcohol     Types: 1 Standard drinks or equivalent per week    Drug use: Yes     Types: Cannabis              Review of Systems    Positive for stated Chief Complaint: Eye Problem    Other systems are as noted in HPI.  Constitutional and vital signs reviewed.      All other systems reviewed and negative except as noted above.    Physical Exam     ED Triage Vitals [09/15/24 1001]   BP (!) 145/99   Pulse 78   Resp 18   Temp 98.3 °F (36.8 °C)   Temp src Temporal   SpO2 98 %   O2 Device None (Room air)       Current Vitals:   Vital Signs  BP: (!) 145/99  Pulse: 78  Resp: 18  Temp: 98.3 °F (36.8 °C)  Temp src: Temporal    Oxygen Therapy  SpO2: 98 %  O2 Device: None (Room air)        Right Eye Chart Acuity: 20/50, Corrected (not wearing prescription glasses)  Left Eye Chart Acuity: 20/70, Corrected (not wearing prescription glasses)    Physical Exam      General Appearance: Alert,  cooperative, no SOB or labored breathing, appropriate for age   Head: Normocephalic, without obvious abnormality   Eyes: EOMI without nystagmus. PERRLA. Left eye: mild conjunctival injection.  No obvious foreign body. No surrounding erythema or edema.       Ears: Bilateral:TM clear and pearly gray color. No external auditory canal edema or discharge. No pinna, tragus, or mastoid TTP.  Nose: Nares symmetrical, No maxillary or frontal sinus tenderness   Throat: Lips, tongue, and mucosa are moist, pink, and intact; teeth intact. No erythema, edema, exudates of posterior pharynx. Uvula midline, palate symmetrical  Neck: Supple; no lymphadenopathy   Skin:  No rashes.    Procedure:     Fluorescein/Tetracaine 1 drop instilled in left eye. Approx 2mm fluorescent highlight observed peripherally at 6 o'clock. Pt tolerated procedure well.       ED Course   Labs Reviewed - No data to display          MDM             Medical Decision Making  44-year-old male who comes in today complaining of left eye irritation and crusting when he woke up today.  Symptoms started 48 hours ago noncontact lens wear but wears glasses.    Problems Addressed:  Left corneal abrasion, initial encounter: acute illness or injury    Amount and/or Complexity of Data Reviewed  ECG/medicine tests: ordered and independent interpretation performed. Decision-making details documented in ED Course.     Details: Tetracaine and fluorescein    Risk  OTC drugs.  Prescription drug management.  Risk Details: Clinical diagnosis: Corneal abrasion    Differential diagnosis includes corneal abrasion, bacterial conjunctivitis, orbital cellulitis    Patient has no surrounding erythema, has purulent yellow drainage, will start on  antibiotic drops, warm compresses good handwashing, if no improvement be re-evaluated in 48 hours        Disposition and Plan     Clinical Impression:  1. Left corneal abrasion, initial encounter         Disposition:  Discharge  9/15/2024 10:40  am    Follow-up:  Priscila Curry MD  [address]  [facility] [address]  [facility] [address]  [phone]    Schedule an appointment as soon as possible for a visit   If symptoms worsen          Medications Prescribed:  Discharge Medication List as of 9/15/2024 10:43 AM        START taking these medications    Details   ofloxacin 0.3 % Ophthalmic Solution Use the eyedrop every 4 hours for the first 2 days in affected eye then 4x a day for 5 days, Normal, Disp-1 each, R-0

## 2024-09-25 ENCOUNTER — HOSPITAL ENCOUNTER (OUTPATIENT)
Age: 44
Discharge: HOME | End: 2024-09-25
Payer: COMMERCIAL

## 2024-09-25 VITALS
RESPIRATION RATE: 20 BRPM | DIASTOLIC BLOOD PRESSURE: 88 MMHG | OXYGEN SATURATION: 99 % | TEMPERATURE: 99 F | SYSTOLIC BLOOD PRESSURE: 132 MMHG | HEART RATE: 82 BPM

## 2024-09-25 DIAGNOSIS — J06.9 VIRAL URI WITH COUGH: Primary | ICD-10-CM

## 2024-09-25 DIAGNOSIS — Z20.822 ENCOUNTER FOR LABORATORY TESTING FOR COVID-19 VIRUS: ICD-10-CM

## 2024-09-25 PROBLEM — B34.2 CORONAVIRUS INFECTION: Status: ACTIVE | Noted: 2024-09-25

## 2024-09-25 PROBLEM — J02.9 ACUTE PHARYNGITIS: Status: ACTIVE | Noted: 2024-09-25

## 2024-09-25 PROBLEM — N20.0 CALCULUS OF KIDNEY: Status: ACTIVE | Noted: 2024-09-25

## 2024-09-25 PROBLEM — R50.9 FEVER: Status: ACTIVE | Noted: 2024-09-25

## 2024-09-25 PROBLEM — E55.9 VITAMIN D DEFICIENCY: Status: ACTIVE | Noted: 2024-09-25

## 2024-09-25 PROBLEM — M25.511 PAIN OF RIGHT SHOULDER REGION: Status: ACTIVE | Noted: 2024-09-25

## 2024-09-25 PROBLEM — N52.9 ERECTILE DYSFUNCTION: Status: ACTIVE | Noted: 2024-09-25

## 2024-09-25 PROBLEM — J20.9 ACUTE BRONCHITIS: Status: ACTIVE | Noted: 2024-09-25

## 2024-09-25 PROBLEM — J01.00 ACUTE MAXILLARY SINUSITIS: Status: ACTIVE | Noted: 2024-09-25

## 2024-09-25 PROBLEM — E66.9 CLASS 1 OBESITY: Status: ACTIVE | Noted: 2024-09-25

## 2024-09-25 PROBLEM — E66.811 CLASS 1 OBESITY: Status: ACTIVE | Noted: 2024-09-25

## 2024-09-25 PROBLEM — H66.009 ACUTE SUPPURATIVE OTITIS MEDIA WITHOUT SPONTANEOUS RUPTURE OF EAR DRUM: Status: ACTIVE | Noted: 2024-09-25

## 2024-09-25 LAB
S PYO AG THROAT QL: NEGATIVE
SARS-COV-2 RNA RESP QL NAA+PROBE: NOT DETECTED

## 2024-09-25 PROCEDURE — 99214 OFFICE O/P EST MOD 30 MIN: CPT | Performed by: NURSE PRACTITIONER

## 2024-09-25 PROCEDURE — U0002 COVID-19 LAB TEST NON-CDC: HCPCS | Performed by: NURSE PRACTITIONER

## 2024-09-25 PROCEDURE — 87880 STREP A ASSAY W/OPTIC: CPT | Performed by: NURSE PRACTITIONER

## 2024-09-25 RX ORDER — FLUTICASONE PROPIONATE 50 MCG
2 SPRAY, SUSPENSION (ML) NASAL DAILY
Qty: 16 G | Refills: 0 | Status: SHIPPED | OUTPATIENT
Start: 2024-09-25 | End: 2024-10-25

## 2024-09-25 RX ORDER — BENZONATATE 100 MG/1
100 CAPSULE ORAL 3 TIMES DAILY PRN
Qty: 30 CAPSULE | Refills: 0 | Status: SHIPPED | OUTPATIENT
Start: 2024-09-25 | End: 2024-10-25

## 2024-09-25 NOTE — ED PROVIDER NOTES
Patient Seen in: [facility]      History     Chief Complaint   Patient presents with    Cough/URI    Sore Throat     Stated Complaint: sore throat cough ear pain    Subjective:   44-year-old male presents with complaint of sore throat, body aches, cough, nasal congestion, and bilateral ear pressure that began 2 days ago.  Patient has taken ibuprofen and cough drops for symptoms.    Patient denies fever, chills, difficulty swallowing, shortness of breath, nausea, vomiting, diarrhea      The history is provided by the patient.           Objective:   Past Medical History:    Calculus of kidney    Sleep apnea    no equipment              Past Surgical History:   Procedure Laterality Date    Removal gallbladder      Removal of kidney stone                  Social History     Socioeconomic History    Marital status:    Tobacco Use    Smoking status: Every Day     Current packs/day: 0.50     Types: Cigarettes    Smokeless tobacco: Never   Vaping Use    Vaping status: Never Used   Substance and Sexual Activity    Alcohol use: Yes     Alcohol/week: 1.0 standard drink of alcohol     Types: 1 Standard drinks or equivalent per week    Drug use: Yes     Types: Cannabis              Review of Systems   Constitutional:  Negative for chills and fever.   HENT:  Positive for congestion, ear pain and sore throat. Negative for trouble swallowing.    Respiratory:  Positive for cough. Negative for shortness of breath.    Cardiovascular:  Negative for chest pain.   Gastrointestinal:  Negative for diarrhea, nausea and vomiting.       Positive for stated Chief Complaint: Cough/URI and Sore Throat    Other systems are as noted in HPI.  Constitutional and vital signs reviewed.      All other systems reviewed and negative except as noted above.    Physical Exam     ED Triage Vitals [09/25/24 0821]   BP (!) 121/100   Pulse 100   Resp 20   Temp 98.9 °F (37.2 °C)   Temp src Temporal   SpO2 99 %   O2 Device None (Room  air)       Current Vitals:   Vital Signs  BP: 132/88  Pulse: 82  Resp: 20  Temp: 98.9 °F (37.2 °C)  Temp src: Temporal    Oxygen Therapy  SpO2: 99 %  O2 Device: None (Room air)            Physical Exam  Constitutional:       General: He is not in acute distress.     Appearance: He is well-developed. He is not ill-appearing, toxic-appearing or diaphoretic.   HENT:      Head: Normocephalic.      Right Ear: Tympanic membrane normal. No tenderness. Tympanic membrane is not erythematous.      Left Ear: Tympanic membrane normal. No tenderness. Tympanic membrane is not erythematous.      Mouth/Throat:      Mouth: Mucous membranes are moist.      Pharynx: Uvula midline. Posterior oropharyngeal erythema (mild) present. No pharyngeal swelling, oropharyngeal exudate or uvula swelling.      Tonsils: No tonsillar exudate or tonsillar abscesses. 1+ on the right. 1+ on the left.   Eyes:      Conjunctiva/sclera: Conjunctivae normal.   Cardiovascular:      Rate and Rhythm: Normal rate and regular rhythm.      Heart sounds: No murmur heard.  Pulmonary:      Effort: Pulmonary effort is normal. No respiratory distress.      Breath sounds: Normal breath sounds. No stridor. No wheezing, rhonchi or rales.   Musculoskeletal:      Cervical back: Normal range of motion and neck supple.   Lymphadenopathy:      Cervical: No cervical adenopathy.   Skin:     General: Skin is warm and dry.   Neurological:      Mental Status: He is alert.   Psychiatric:         Mood and Affect: Mood normal.               ED Course     Labs Reviewed   POCT RAPID STREP - Normal   RAPID SARS-COV-2 BY PCR - Normal            [place]                  Medical Decision Making  44-year-old male presents with complaint of sore throat, body aches, cough, nasal congestion, and bilateral ear pressure that began 2 days ago.  Diff dx include strep vs Covid vs viral uri with cough.   On exam, pt is well appearing, normal vitals, lungs clear.   Rapid strep and Covid negative.    Supportive care discussed; see AVS.  Follow up with PCP if not improving in the next week.   Pt agreeable to plan.       Amount and/or Complexity of Data Reviewed  External Data Reviewed: notes.  Labs: ordered.    Risk  OTC drugs.  Prescription drug management.        Disposition and Plan     Clinical Impression:  1. Viral URI with cough    2. Encounter for laboratory testing for COVID-19 virus         Disposition:  Discharge  9/25/2024  8:46 am    Follow-up:  No follow-up provider specified.        Medications Prescribed:  Discharge Medication List as of 9/25/2024  8:46 AM        START taking these medications    Details   !! fluticasone propionate 50 MCG/ACT Nasal Suspension 2 sprays by Nasal route daily., Normal, Disp-16 g, R-0       !! - Potential duplicate medications found. Please discuss with provider.

## 2024-09-25 NOTE — DISCHARGE INSTRUCTIONS
We recommend the following for your condition:  Claritin D once a day  Flonase 2 sprays to each side of nose once a day - con't for 2-4 weeks  Nasal saline - either spray (\"Ocean\" brand) or [facility] Med Sinus Rinse 3 times a day  Tessalon: 1 tab three times a day for 7 days.   Rest and push fluids  Hot lemon tea with honey  Steam twice a day (either in shower or with cup of hot water and a towel over your head)    Follow up if not improving in the next week.

## 2025-02-02 ENCOUNTER — HOSPITAL ENCOUNTER (OUTPATIENT)
Age: 45
Discharge: HOME | End: 2025-02-02
Payer: COMMERCIAL

## 2025-02-02 VITALS
SYSTOLIC BLOOD PRESSURE: 120 MMHG | OXYGEN SATURATION: 97 % | HEART RATE: 83 BPM | DIASTOLIC BLOOD PRESSURE: 91 MMHG | TEMPERATURE: 98 F | RESPIRATION RATE: 18 BRPM

## 2025-02-02 DIAGNOSIS — H93.19 TINNITUS, UNSPECIFIED LATERALITY: ICD-10-CM

## 2025-02-02 DIAGNOSIS — H81.10 BENIGN PAROXYSMAL POSITIONAL VERTIGO, UNSPECIFIED LATERALITY: Primary | ICD-10-CM

## 2025-02-02 DIAGNOSIS — H65.93 MIDDLE EAR EFFUSION, BILATERAL: ICD-10-CM

## 2025-02-02 RX ORDER — MECLIZINE HYDROCHLORIDE 25 MG/1
25 TABLET ORAL 3 TIMES DAILY PRN
Qty: 30 TABLET | Refills: 0 | Status: SHIPPED | OUTPATIENT
Start: 2025-02-02

## 2025-02-02 NOTE — DISCHARGE INSTRUCTIONS
Please take over-the-counter antihistamines and use Flonase.  Take meclizine 1 tab every 8 hours for the next 2 to 3 days stay hydrated.  Change positions slowly.  Follow closely with your primary care provider and ENT as discussed.

## 2025-02-02 NOTE — ED PROVIDER NOTES
Patient Seen in: [facility]      History     Chief Complaint   Patient presents with    Ear Problem Pain     Stated Complaint: Ear pain    Subjective:   This is a 44-year-old male with below stated medical history.  Presents to immediate care for bilateral ear discomfort.  Reports a ringing in both of his ears that started over the last few days.  Yesterday he had a few episodes of dizziness only when changing position.  Felt like the room was spinning.  Dizziness resolved with rest.  Reports yesterday he did have a headache that also resolved.  No nasal congestion, sore throat, or other upper respiratory symptoms.  No recent swimming or drainage from the ear canal.  No fevers.  Reports ringing in his years is not pulsatile.  No head injury.  No numbness, tingling, difficulty moving extremities, or word finding.  No treatment attempted prior to arrival.    The history is provided by the patient.             Objective:     Past Medical History:    Calculus of kidney    Esophageal reflux    Sleep apnea    no equipment              Past Surgical History:   Procedure Laterality Date    Removal gallbladder      Removal of kidney stone                  Social History     Socioeconomic History    Marital status:    Tobacco Use    Smoking status: Every Day     Current packs/day: 0.50     Types: Cigarettes    Smokeless tobacco: Never   Vaping Use    Vaping status: Never Used   Substance and Sexual Activity    Alcohol use: Yes     Alcohol/week: 1.0 standard drink of alcohol     Types: 1 Standard drinks or equivalent per week    Drug use: Yes     Types: Cannabis              Review of Systems   Constitutional:  Negative for chills and fever.   HENT:  Positive for ear pain. Negative for congestion, ear discharge, hearing loss and sore throat.    Respiratory:  Negative for cough.    Cardiovascular:  Negative for chest pain, palpitations and leg swelling.   Gastrointestinal:  Negative for abdominal  pain, diarrhea, nausea and vomiting.   Genitourinary:  Negative for dysuria.   Musculoskeletal:  Negative for back pain, neck pain and neck stiffness.   Skin:  Negative for rash.   Allergic/Immunologic: Negative for environmental allergies.   Neurological:  Positive for headaches. Negative for dizziness, tremors, seizures, syncope, facial asymmetry, speech difficulty, weakness, light-headedness and numbness.       Positive for stated complaint: Ear pain  Other systems are as noted in HPI.  Constitutional and vital signs reviewed.      All other systems reviewed and negative except as noted above.    Physical Exam     ED Triage Vitals [02/02/25 1417]   BP (!) 120/91   Pulse 83   Resp 18   Temp 98.3 °F (36.8 °C)   Temp src Oral   SpO2 97 %   O2 Device None (Room air)       Current Vitals:   Vital Signs  BP: (!) 120/91  Pulse: 83  Resp: 18  Temp: 98.3 °F (36.8 °C)  Temp src: Oral    Oxygen Therapy  SpO2: 97 %  O2 Device: None (Room air)        Physical Exam  Vitals and nursing note reviewed.   Constitutional:       General: He is not in acute distress.     Appearance: Normal appearance. He is obese. He is not ill-appearing, toxic-appearing or diaphoretic.   HENT:      Head: Normocephalic and atraumatic.      Right Ear: Ear canal and external ear normal. A middle ear effusion is present. There is no impacted cerumen. No mastoid tenderness. Tympanic membrane is not injected, scarred, perforated, erythematous, retracted or bulging.      Left Ear: Ear canal and external ear normal. A middle ear effusion is present. There is no impacted cerumen. No mastoid tenderness. Tympanic membrane is not injected, scarred, perforated, erythematous, retracted or bulging.      Nose: Nose normal. No congestion or rhinorrhea.      Mouth/Throat:      Mouth: Mucous membranes are moist.      Pharynx: Oropharynx is clear. No oropharyngeal exudate or posterior oropharyngeal erythema.   Eyes:      General:         Right eye: No discharge.          Left eye: No discharge.      Extraocular Movements: Extraocular movements intact.      Conjunctiva/sclera: Conjunctivae normal.   Cardiovascular:      Rate and Rhythm: Normal rate and regular rhythm.      Heart sounds: Normal heart sounds. No murmur heard.  Pulmonary:      Effort: Pulmonary effort is normal. No respiratory distress.      Breath sounds: Normal breath sounds. No stridor. No wheezing, rhonchi or rales.   Musculoskeletal:      Cervical back: Neck supple.      Right lower leg: No edema.      Left lower leg: No edema.   Skin:     General: Skin is warm and dry.      Capillary Refill: Capillary refill takes less than 2 seconds.      Findings: No rash.   Neurological:      General: No focal deficit present.      Mental Status: He is alert and oriented to person, place, and time.   Psychiatric:         Mood and Affect: Mood normal.         Behavior: Behavior normal.            ED Course   Labs Reviewed - No data to display         DC home.       MDM      Vital signs stable.  Patient is well-appearing and nontoxic looking.  Presents to immediate care for ear discomfort, tinnitus, and intermittent dizziness that is worse with position change.    Differential diagnosis includes but is not limited to viral illness, middle ear effusion, otitis media, vertigo, hearing loss, less likely intracranial abnormality.    Patient reports no current dizziness or headache.  His neurological exam is unremarkable with no focal deficits.  He does report the short episodes of dizziness or only with position changes.  Bilateral TMs are intact reveal middle ear effusion with no evidence of infectious process.    Clinical impression is benign positional vertigo    DC home.  Recommended over-the-counter antihistamines daily and Flonase nasal spray.  Rx given for meclizine.  Advised to take this medicine 1 tab every 8 hours for the next 2 to 3 days.  Drink plenty of fluids and change positions slowly.  We discussed close follow-up  with his PMD and ENT.  To ER precautions reviewed.  He verbalized understanding, and agreed with the care.  All questions answered.         Medical Decision Making      Disposition and Plan     Clinical Impression:  1. Benign paroxysmal positional vertigo, unspecified laterality    2. Tinnitus, unspecified laterality    3. Middle ear effusion, bilateral         Disposition:  Discharge  2/2/2025  2:40 pm    Follow-up:  Jeramy Knight MD  [address] [facility] [address]  [phone]    In 1 week      Your PMD    In 1 week            Medications Prescribed:  Current Discharge Medication List        START taking these medications    Details   meclizine 25 MG Oral Tab Take 1 tablet (25 mg total) by mouth 3 (three) times daily as needed.  Qty: 30 tablet, Refills: 0                 Supplementary Documentation:

## 2025-05-08 ENCOUNTER — APPOINTMENT (OUTPATIENT)
Dept: CT IMAGING | Facility: HOSPITAL | Age: 45
End: 2025-05-08
Attending: EMERGENCY MEDICINE
Payer: COMMERCIAL

## 2025-05-08 ENCOUNTER — HOSPITAL ENCOUNTER (EMERGENCY)
Facility: HOSPITAL | Age: 45
Discharge: HOME | End: 2025-05-08
Attending: EMERGENCY MEDICINE
Payer: COMMERCIAL

## 2025-05-08 ENCOUNTER — HOSPITAL ENCOUNTER (OUTPATIENT)
Age: 45
Discharge: ANOTHER HOSPITAL | End: 2025-05-08
Payer: COMMERCIAL

## 2025-05-08 VITALS
OXYGEN SATURATION: 99 % | SYSTOLIC BLOOD PRESSURE: 148 MMHG | HEIGHT: 66 IN | TEMPERATURE: 99 F | RESPIRATION RATE: 18 BRPM | DIASTOLIC BLOOD PRESSURE: 92 MMHG | BODY MASS INDEX: 28.93 KG/M2 | WEIGHT: 180 LBS | HEART RATE: 86 BPM

## 2025-05-08 VITALS
TEMPERATURE: 98 F | SYSTOLIC BLOOD PRESSURE: 148 MMHG | DIASTOLIC BLOOD PRESSURE: 92 MMHG | OXYGEN SATURATION: 99 % | WEIGHT: 180 LBS | HEART RATE: 77 BPM | RESPIRATION RATE: 20 BRPM | HEIGHT: 66 IN | BODY MASS INDEX: 28.93 KG/M2

## 2025-05-08 DIAGNOSIS — R10.9 ABDOMINAL PAIN OF UNKNOWN ETIOLOGY: Primary | ICD-10-CM

## 2025-05-08 DIAGNOSIS — R10.32 LLQ ABDOMINAL PAIN: Primary | ICD-10-CM

## 2025-05-08 LAB
ALBUMIN SERPL-MCNC: 5 G/DL (ref 3.2–4.8)
ALBUMIN/GLOB SERPL: 1.6 {RATIO} (ref 1–2)
ALP LIVER SERPL-CCNC: 60 U/L (ref 45–117)
ALT SERPL-CCNC: 67 U/L (ref 10–49)
ANION GAP SERPL CALC-SCNC: 7 MMOL/L (ref 0–18)
AST SERPL-CCNC: 51 U/L (ref ?–34)
BASOPHILS # BLD AUTO: 0.06 X10(3) UL (ref 0–0.2)
BASOPHILS NFR BLD AUTO: 0.8 %
BILIRUB SERPL-MCNC: 0.9 MG/DL (ref 0.3–1.2)
BILIRUB UR QL STRIP.AUTO: NEGATIVE
BUN BLD-MCNC: 10 MG/DL (ref 9–23)
CALCIUM BLD-MCNC: 9.2 MG/DL (ref 8.7–10.6)
CHLORIDE SERPL-SCNC: 106 MMOL/L (ref 98–112)
CLARITY UR REFRACT.AUTO: CLEAR
CLARITY UR: CLEAR
CO2 SERPL-SCNC: 26 MMOL/L (ref 21–32)
COLOR UR AUTO: YELLOW
COLOR UR: YELLOW
CREAT BLD-MCNC: 1.11 MG/DL (ref 0.7–1.3)
EGFRCR SERPLBLD CKD-EPI 2021: 84 ML/MIN/1.73M2 (ref 60–?)
EOSINOPHIL # BLD AUTO: 0.14 X10(3) UL (ref 0–0.7)
EOSINOPHIL NFR BLD AUTO: 1.8 %
ERYTHROCYTE [DISTWIDTH] IN BLOOD BY AUTOMATED COUNT: 13.2 %
GLOBULIN PLAS-MCNC: 3.1 G/DL (ref 2–3.5)
GLUCOSE BLD-MCNC: 104 MG/DL (ref 70–99)
GLUCOSE UR STRIP-MCNC: NEGATIVE MG/DL
GLUCOSE UR STRIP.AUTO-MCNC: 300 MG/DL
HCT VFR BLD AUTO: 45.5 % (ref 39–53)
HGB BLD-MCNC: 15.2 G/DL (ref 13–17.5)
HGB UR QL STRIP: NEGATIVE
IMM GRANULOCYTES # BLD AUTO: 0.01 X10(3) UL (ref 0–1)
IMM GRANULOCYTES NFR BLD: 0.1 %
LEUKOCYTE ESTERASE UR QL STRIP.AUTO: NEGATIVE
LEUKOCYTE ESTERASE UR QL STRIP: NEGATIVE
LIPASE SERPL-CCNC: 31 U/L (ref 12–53)
LYMPHOCYTES # BLD AUTO: 3.11 X10(3) UL (ref 1–4)
LYMPHOCYTES NFR BLD AUTO: 39.9 %
MCH RBC QN AUTO: 28.6 PG (ref 26–34)
MCHC RBC AUTO-ENTMCNC: 33.4 G/DL (ref 31–37)
MCV RBC AUTO: 85.5 FL (ref 80–100)
MONOCYTES # BLD AUTO: 0.7 X10(3) UL (ref 0.1–1)
MONOCYTES NFR BLD AUTO: 9 %
NEUTROPHILS # BLD AUTO: 3.78 X10 (3) UL (ref 1.5–7.7)
NEUTROPHILS # BLD AUTO: 3.78 X10(3) UL (ref 1.5–7.7)
NEUTROPHILS NFR BLD AUTO: 48.4 %
NITRITE UR QL STRIP.AUTO: NEGATIVE
NITRITE UR QL STRIP: NEGATIVE
OSMOLALITY SERPL CALC.SUM OF ELEC: 287 MOSM/KG (ref 275–295)
PH UR STRIP.AUTO: 5.5 [PH] (ref 5–8)
PH UR STRIP: 6 [PH]
PLATELET # BLD AUTO: 297 10(3)UL (ref 150–450)
POTASSIUM SERPL-SCNC: 3.6 MMOL/L (ref 3.5–5.1)
PROT SERPL-MCNC: 8.1 G/DL (ref 5.7–8.2)
PROT UR STRIP-MCNC: 30 MG/DL
RBC # BLD AUTO: 5.32 X10(6)UL (ref 4.3–5.7)
RBC UR QL AUTO: NEGATIVE
SODIUM SERPL-SCNC: 139 MMOL/L (ref 136–145)
SP GR UR STRIP.AUTO: 1.02 (ref 1–1.03)
SP GR UR STRIP: 1.02
UROBILINOGEN UR STRIP-ACNC: <2 MG/DL
UROBILINOGEN UR STRIP.AUTO-MCNC: NORMAL MG/DL
WBC # BLD AUTO: 7.8 X10(3) UL (ref 4–11)

## 2025-05-08 PROCEDURE — 83690 ASSAY OF LIPASE: CPT

## 2025-05-08 PROCEDURE — 80053 COMPREHEN METABOLIC PANEL: CPT | Performed by: EMERGENCY MEDICINE

## 2025-05-08 PROCEDURE — 96361 HYDRATE IV INFUSION ADD-ON: CPT

## 2025-05-08 PROCEDURE — 85025 COMPLETE CBC W/AUTO DIFF WBC: CPT | Performed by: EMERGENCY MEDICINE

## 2025-05-08 PROCEDURE — 99215 OFFICE O/P EST HI 40 MIN: CPT | Performed by: PHYSICIAN ASSISTANT

## 2025-05-08 PROCEDURE — 99284 EMERGENCY DEPT VISIT MOD MDM: CPT

## 2025-05-08 PROCEDURE — 81002 URINALYSIS NONAUTO W/O SCOPE: CPT | Performed by: PHYSICIAN ASSISTANT

## 2025-05-08 PROCEDURE — 80053 COMPREHEN METABOLIC PANEL: CPT

## 2025-05-08 PROCEDURE — 81003 URINALYSIS AUTO W/O SCOPE: CPT | Performed by: EMERGENCY MEDICINE

## 2025-05-08 PROCEDURE — 83690 ASSAY OF LIPASE: CPT | Performed by: EMERGENCY MEDICINE

## 2025-05-08 PROCEDURE — 99285 EMERGENCY DEPT VISIT HI MDM: CPT

## 2025-05-08 PROCEDURE — 96374 THER/PROPH/DIAG INJ IV PUSH: CPT

## 2025-05-08 PROCEDURE — 85025 COMPLETE CBC W/AUTO DIFF WBC: CPT

## 2025-05-08 PROCEDURE — 74176 CT ABD & PELVIS W/O CONTRAST: CPT | Performed by: EMERGENCY MEDICINE

## 2025-05-08 RX ORDER — KETOROLAC TROMETHAMINE 15 MG/ML
15 INJECTION, SOLUTION INTRAMUSCULAR; INTRAVENOUS ONCE
Status: COMPLETED | OUTPATIENT
Start: 2025-05-08 | End: 2025-05-08

## 2025-05-08 RX ORDER — KETOROLAC TROMETHAMINE 10 MG/1
10 TABLET, FILM COATED ORAL EVERY 6 HOURS PRN
Qty: 20 TABLET | Refills: 0 | Status: SHIPPED | OUTPATIENT
Start: 2025-05-08 | End: 2025-05-13

## 2025-05-08 NOTE — ED PROVIDER NOTES
Patient Seen in: [facility]      History     Chief Complaint   Patient presents with    Abdomen/Flank Pain     Stated Complaint: stomach pain    Subjective:   HPI    Patient is a 45-year-old male with past medical history of kidney stones that presents to immediate care due to abdominal pain x 3 days.  Notes increasing left lower abdominal pain worsening over the past few days.  Associate symptoms include nausea and diarrhea.  Patient states symptoms feel different than prior obstructing stone.  Denies treatment prior to arrival.  History of Present Illness               Objective:     Past Medical History:    Calculus of kidney    Esophageal reflux    Sleep apnea    no equipment              Past Surgical History:   Procedure Laterality Date    Removal gallbladder      Removal of kidney stone                  Social History     Socioeconomic History    Marital status:    Tobacco Use    Smoking status: Every Day     Current packs/day: 0.50     Types: Cigarettes    Smokeless tobacco: Never   Vaping Use    Vaping status: Never Used   Substance and Sexual Activity    Alcohol use: Yes     Alcohol/week: 1.0 standard drink of alcohol     Types: 1 Standard drinks or equivalent per week    Drug use: Yes     Types: Cannabis              Review of Systems    Positive for stated complaint: stomach pain  Other systems are as noted in HPI.  Constitutional and vital signs reviewed.      All other systems reviewed and negative except as noted above.                  Physical Exam     ED Triage Vitals [05/08/25 1649]   BP (!) 148/92   Pulse 86   Resp 18   Temp 98.5 °F (36.9 °C)   Temp src Oral   SpO2 99 %   O2 Device None (Room air)       Current Vitals:   Vital Signs  BP: (!) 148/92  Pulse: 86  Resp: 18  Temp: 98.5 °F (36.9 °C)  Temp src: Oral    Oxygen Therapy  SpO2: 99 %  O2 Device: None (Room air)        Physical Exam  Vital signs reviewed. Nursing note reviewed.  Constitutional: Well-developed.  Well-nourished. In no acute distress  HENT: Mucous membranes moist.   EYES: No scleral icterus or conjunctival injection.  NECK: Full ROM. Supple.   CARDIAC: Normal rate. Normal S1/ S2.   PULM/CHEST: No wheezes  ABD: Soft, + LLQ ttp  Extremities: Full ROM  NEURO: Awake, alert, following commands, moving extremities, answering questions.   SKIN: Warm and dry. No rash or lesions.  PSYCH: Normal judgment. Normal affect.      Physical Exam                ED Course     Labs Reviewed   H POCT URINALYSIS DIPSTICK - Abnormal; Notable for the following components:       Result Value    Protein urine 30 (*)     Ketone, Urine Trace (*)     Bilirubin, Urine Small (*)     All other components within normal limits          Results                           MDM      Patient is a 45-year-old male who presents to immediate care due to abdominal pain worsening over the past 3 days.  Patient arrives with stable vitals.  Physical exam showing left lower abdominal tenderness.  Differential diagnosis include acute diverticulitis, nephrolithiasis, gastritis.  Discussed with patient to proceed to nearest emergency department for further evaluation management.  Patient indicates he will go to [place] ED at this time.  History given by patient.        Medical Decision Making      Disposition and Plan     Clinical Impression:  1. LLQ abdominal pain         Disposition:  Ic to ed  5/8/2025  5:04 pm    Follow-up:  No follow-up provider specified.        Medications Prescribed:  Discharge Medication List as of 5/8/2025  5:04 PM          Supplementary Documentation:

## 2025-05-08 NOTE — ED INITIAL ASSESSMENT (HPI)
Presents for LLQ abdominal pain, nausea, and diarrhea over the past few days. Pain is constant this afternoon. No gallbladder, Hx of kidney stones, 2 with surgical intervention. Denies fever.

## 2025-05-08 NOTE — ED INITIAL ASSESSMENT (HPI)
Left mid abdominal pain radiating into left side of back for a few days. +Diarrhea. +Nausea. Reports hx of kidney stones, but states that this feels different. Denies fevers.

## 2025-05-09 NOTE — ED PROVIDER NOTES
Patient Seen in: [facility] Emergency Department      History     Chief Complaint   Patient presents with    Abdomen/Flank Pain     Stated Complaint: LLQ abd pain    Subjective:   HPI  45-year-old male who presents to the emergency department complaining of having left lower quadrant abdominal pain that been present for the past couple days.  The patient says the pains become constant this afternoon.  He has a history of kidney stones he says he thinks the pain may be similar to the kidney stones at the past.  There is some radiation to the left flank.  He denies fevers or chills.  Reviewing his records he was seen on 10/9/2023 for cystoscopy and right retrograde pyelogram right ureteroscopy laser lithotripsy and stone extraction with right ureteral stent placement.  There were no reported fevers or chills.  He has recently had some diarrhea.    History of Present Illness               Objective:     Past Medical History:    Calculus of kidney    Esophageal reflux    Sleep apnea    no equipment              Past Surgical History:   Procedure Laterality Date    Removal gallbladder      Removal of kidney stone                  Social History     Socioeconomic History    Marital status:    Tobacco Use    Smoking status: Every Day     Current packs/day: 0.50     Types: Cigarettes    Smokeless tobacco: Never   Vaping Use    Vaping status: Never Used   Substance and Sexual Activity    Alcohol use: Yes     Alcohol/week: 1.0 standard drink of alcohol     Types: 1 Standard drinks or equivalent per week    Drug use: Yes     Types: Cannabis                                Physical Exam     ED Triage Vitals [05/08/25 1736]   BP (!) 144/98   Pulse 91   Resp 20   Temp 97.6 °F (36.4 °C)   Temp src Oral   SpO2 96 %   O2 Device None (Room air)       Current Vitals:   Vital Signs  BP: (!) 148/92  Pulse: 77  Resp: 20  Temp: 97.6 °F (36.4 °C)  Temp src: Oral    Oxygen Therapy  SpO2: 99 %  O2 Device: None (Room  air)        Physical Exam  General: This a pleasant nontoxic appearing patient in no apparent distress alert and oriented ×3  HEENT: Head is atraumatic normocephalic pupils are reactive to light.    Lungs: Clear to auscultation bilaterally.  No wheezes, rhonchi, or rales appreciated.  No accessory muscle use noted for breathing.  Cardiac: Regular rate and rhythm.  Normal S1 and 2 without murmurs or ectopy appreciated  Abdomen: Soft on exam with some mild left CVA discomfort and some left lower quadrant discomfort on palpation.  No involuntary guarding noted.  Extremities: Moving all 4 without difficulty no deformities.  Physical Exam                ED Course     Labs Reviewed   COMP METABOLIC PANEL (14) - Abnormal; Notable for the following components:       Result Value    Glucose 104 (*)     AST 51 (*)     ALT 67 (*)     Albumin 5.0 (*)     All other components within normal limits   URINALYSIS WITH CULTURE REFLEX - Abnormal; Notable for the following components:    Glucose Urine 300 (*)     Ketones Urine Trace (*)     Protein Urine Trace (*)     All other components within normal limits   LIPASE - Normal   CBC WITH DIFFERENTIAL WITH PLATELET          Results        Narrative  PROCEDURE:  CT ABDOMEN+PELVIS KIDNEYSTONE 2D RNDR(NO IV,NO ORAL)(CPT=74176)     COMPARISON:  [facility], CT, CT ABDOMEN+PELVIS KIDNEYSTONE 2D RNDR(NO IV,NO ORAL)(CPT=74176), 10/06/2023, 3:14 PM.     INDICATIONS:  LLQ abd pain     TECHNIQUE:  Unenhanced multislice CT scanning from above the kidneys to below the urinary bladder.  2D rendering are generated on the CT scanner workstation to localize potential stones in the cranio-caudal plane.  Dose reduction techniques were used.  Dose information is transmitted to the ACR (American College of Radiology) NRDR (National Radiology Data Registry) which includes the Dose Index Registry.     PATIENT STATED HISTORY: (As transcribed by Technologist)  Left flank pain, nausea. Patient  denies urinary symptoms         FINDINGS:    KIDNEYS:  Nonobstructing 3 mm calculus in the left kidney.  No hydronephrosis bilaterally.  BLADDER:  No mass, calculus or significant wall thickening.  ADRENALS:  No mass or enlargement.    LIVER:  Diffuse hepatic steatosis.  BILIARY:  Status post cholecystectomy.  No significant biliary ductal dilation.  PANCREAS:  No lesion, fluid collection, ductal dilatation, or atrophy.    SPLEEN:  No enlargement or focal lesion.    AORTA/VASCULAR:  No aneurysm.    RETROPERITONEUM:  No mass or adenopathy.    BOWEL/MESENTERY:  No dilated bowel or wall thickening.  Normal appendix.  ABDOMINAL WALL:  No significant findings  BONES:  No bony lesion or fracture.  PELVIC ORGANS:  Normal for age.    LUNG BASES:  No visible pulmonary or pleural disease.    OTHER:  Negative.                    Impression  CONCLUSION:    1. 3 mm nonobstructing calculus in the left kidney.  No hydronephrosis bilaterally.    2. Diffuse hepatic steatosis.  Please see above for further details.           LOCATION:  [facility]        Dictated by (CST): Eren Becerra MD on 5/08/2025 at 8:58 PM      Finalized by (CST): Eren Becerra MD on 5/08/2025 at 9:02 PM                               MDM    Differential diagnosis include but is not limited to kidney stone, diverticulitis, bowel obstruction, acute appendicitis, acute pancreatitis.  Laboratories were sent.  Lipase was normal.  CBC was normal.  Glucose of 104 AST of 51 ALT of 67 albumin of 5.0  CT scan performed  I reviewed the x-rays and agree with the radiologist report that showed 3 mm nonobstructing calculus in the left kidney.  No hydronephrosis bilaterally.  Diffuse hepatic steatosis.  The patient does not appear to have an acute intra-abdominal pathologic process causing pain at this time.  He has a nonobstructing stone in the left kidney.  Urinalysis was negative.  Patient has nonspecific findings to cause his symptomology.  He has a nonobstructing stone  this is not likely causing pain at this time.  He can be discharged to follow-up as an outpatient with his primary care physician and to return if symptoms progress or worsen.  Continue with Toradol for pain control as an outpatient.  Note to Patient  The 21st Century Cures Act makes medical notes like these available to patients in the interest of transparency. However, be advised this is a medical document and is intended as [id number] communication; it is written in medical language and may appear blunt, direct, or contain abbreviations or verbiage that are unfamiliar. Medical documents are intended to carry relevant information, facts as evident, and the clinical opinion of the practitioner.  Patient was evaluated and a screening exam was performed.   As a treating physician attending to the patient, I determined, within reasonable clinical confidence and prior to discharge, that an emergency medical condition was not or was no longer present.  There was no indication for further evaluation, treatment or admission on an emergency basis.  Comprehensive verbal and written discharge and follow-up instructions were provided to help prevent relapse or worsening.  Patient was instructed to follow-up with their primary care provider for further evaluation and treatment, but to return immediately to the ER for worsening, concerning, new, changing or persisting symptoms.  I discussed the case with the patient and they had no questions, complaints, or concerns.  Patient felt comfortable going home.  ^^Please note that this report has been produced using speech recognition software and may contain errors related to that system including, but not limited to, errors in grammar, punctuation, and spelling, as well as words and phrases that possibly may have been recognized inappropriately.  If there are any questions or concerns, contact the dictating provider for clarification.  Medical Decision Making      Disposition and  Plan     Clinical Impression:  1. Abdominal pain of unknown etiology         Disposition:  Discharge  5/8/2025 10:23 pm    Follow-up:  Your primary care physician    Schedule an appointment as soon as possible for a visit in 2 day(s)            Medications Prescribed:  Current Discharge Medication List        START taking these medications    Details   Ketorolac Tromethamine 10 MG Oral Tab Take 1 tablet (10 mg total) by mouth every 6 (six) hours as needed.  Qty: 20 tablet, Refills: 0             Supplementary Documentation:

## 2025-05-23 ENCOUNTER — TELEPHONE (OUTPATIENT)
Dept: OTHER | Age: 45
End: 2025-05-23

## 2025-05-23 DIAGNOSIS — G47.30 SLEEP APNEA: Primary | ICD-10-CM

## 2025-06-10 ENCOUNTER — TELEPHONE (OUTPATIENT)
Dept: SLEEP MEDICINE | Age: 45
End: 2025-06-10

## 2025-06-11 ENCOUNTER — OFFICE VISIT (OUTPATIENT)
Dept: SLEEP MEDICINE | Age: 45
End: 2025-06-11
Attending: PHYSICIAN ASSISTANT

## 2025-06-11 VITALS — OXYGEN SATURATION: 98 % | HEART RATE: 65 BPM | SYSTOLIC BLOOD PRESSURE: 125 MMHG | DIASTOLIC BLOOD PRESSURE: 85 MMHG

## 2025-06-11 DIAGNOSIS — G47.33 OBSTRUCTIVE SLEEP APNEA SYNDROME: Primary | ICD-10-CM

## 2025-06-11 PROBLEM — E11.9 TYPE 2 DIABETES MELLITUS WITHOUT COMPLICATION, WITHOUT LONG-TERM CURRENT USE OF INSULIN (CMD): Status: ACTIVE | Noted: 2025-06-11

## 2025-06-11 PROBLEM — I10 HTN (HYPERTENSION): Status: ACTIVE | Noted: 2025-06-11

## 2025-06-11 PROBLEM — E66.811 CLASS 1 OBESITY: Status: ACTIVE | Noted: 2025-06-11

## 2025-06-11 PROCEDURE — 99203 OFFICE O/P NEW LOW 30 MIN: CPT | Performed by: NURSE PRACTITIONER

## 2025-06-11 PROCEDURE — 3079F DIAST BP 80-89 MM HG: CPT | Performed by: NURSE PRACTITIONER

## 2025-06-11 PROCEDURE — 3074F SYST BP LT 130 MM HG: CPT | Performed by: NURSE PRACTITIONER

## 2025-06-11 RX ORDER — SILDENAFIL 50 MG/1
50 TABLET, FILM COATED ORAL DAILY PRN
COMMUNITY

## 2025-06-11 RX ORDER — ROSUVASTATIN CALCIUM 10 MG/1
10 TABLET, COATED ORAL DAILY
COMMUNITY

## 2025-06-11 RX ORDER — KETOROLAC TROMETHAMINE 10 MG/1
10 TABLET, FILM COATED ORAL EVERY 6 HOURS PRN
COMMUNITY

## 2025-06-11 RX ORDER — LISINOPRIL 5 MG/1
5 TABLET ORAL DAILY
COMMUNITY

## 2025-06-11 ASSESSMENT — SLEEP AND FATIGUE QUESTIONNAIRES
HOW LIKELY ARE YOU TO NOD OFF OR FALL ASLEEP WHILE SITTING AND TALKING TO SOMEONE: WOULD NEVER DOZE
HOW LIKELY ARE YOU TO NOD OFF OR FALL ASLEEP WHILE SITTING AND READING: SLIGHT CHANCE OF DOZING
HOW LIKELY ARE YOU TO NOD OFF OR FALL ASLEEP WHILE WATCHING TV: WOULD NEVER DOZE
HOW LIKELY ARE YOU TO NOD OFF OR FALL ASLEEP WHEN YOU ARE A PASSENGER IN A CAR FOR AN HOUR WITHOUT A BREAK: WOULD NEVER DOZE
HOW LIKELY ARE YOU TO NOD OFF OR FALL ASLEEP WHILE LYING DOWN TO REST IN THE AFTERNOON WHEN CIRCUMSTANCES PERMIT: MODERATE CHANCE OF DOZING
ESS TOTAL SCORE: 4
HOW LIKELY ARE YOU TO NOD OFF OR FALL ASLEEP IN A CAR, WHILE STOPPED FOR A FEW MINUTES IN TRAFFIC: WOULD NEVER DOZE
HOW LIKELY ARE YOU TO NOD OFF OR FALL ASLEEP WHILE SITTING INACTIVE IN A PUBLIC PLACE: SLIGHT CHANCE OF DOZING
HOW LIKELY ARE YOU TO NOD OFF OR FALL ASLEEP WHILE SITTING QUIETLY AFTER LUNCH WITHOUT ALCOHOL: WOULD NEVER DOZE

## 2025-06-11 ASSESSMENT — ENCOUNTER SYMPTOMS
SLEEP DISTURBANCES DUE TO BREATHING: 0
SNORING: 0
PSYCHIATRIC NEGATIVE: 1
RESPIRATORY NEGATIVE: 1
CONSTITUTIONAL NEGATIVE: 1
NEUROLOGICAL NEGATIVE: 1

## 2025-07-02 ENCOUNTER — APPOINTMENT (OUTPATIENT)
Dept: SLEEP MEDICINE | Age: 45
End: 2025-07-02
Attending: NURSE PRACTITIONER

## 2025-07-07 ENCOUNTER — APPOINTMENT (OUTPATIENT)
Dept: CT IMAGING | Facility: HOSPITAL | Age: 45
End: 2025-07-07
Attending: EMERGENCY MEDICINE
Payer: COMMERCIAL

## 2025-07-07 ENCOUNTER — HOSPITAL ENCOUNTER (EMERGENCY)
Facility: HOSPITAL | Age: 45
Discharge: HOME | End: 2025-07-07
Attending: EMERGENCY MEDICINE
Payer: COMMERCIAL

## 2025-07-07 VITALS
HEIGHT: 66 IN | HEART RATE: 73 BPM | BODY MASS INDEX: 28.13 KG/M2 | DIASTOLIC BLOOD PRESSURE: 67 MMHG | WEIGHT: 175 LBS | OXYGEN SATURATION: 100 % | RESPIRATION RATE: 16 BRPM | TEMPERATURE: 99 F | SYSTOLIC BLOOD PRESSURE: 118 MMHG

## 2025-07-07 DIAGNOSIS — K52.9 GASTROENTERITIS: Primary | ICD-10-CM

## 2025-07-07 LAB
ALBUMIN SERPL-MCNC: 4.3 G/DL (ref 3.2–4.8)
ALBUMIN/GLOB SERPL: 1.5 {RATIO} (ref 1–2)
ALP LIVER SERPL-CCNC: 54 U/L (ref 45–117)
ALT SERPL-CCNC: 22 U/L (ref 10–49)
ANION GAP SERPL CALC-SCNC: 9 MMOL/L (ref 0–18)
AST SERPL-CCNC: 20 U/L (ref ?–34)
BASOPHILS # BLD AUTO: 0.01 X10(3) UL (ref 0–0.2)
BASOPHILS NFR BLD AUTO: 0.1 %
BILIRUB SERPL-MCNC: 0.7 MG/DL (ref 0.3–1.2)
BILIRUB UR QL STRIP.AUTO: NEGATIVE
BUN BLD-MCNC: 8 MG/DL (ref 9–23)
CALCIUM BLD-MCNC: 9.1 MG/DL (ref 8.7–10.6)
CHLORIDE SERPL-SCNC: 106 MMOL/L (ref 98–112)
CLARITY UR REFRACT.AUTO: CLEAR
CO2 SERPL-SCNC: 23 MMOL/L (ref 21–32)
CREAT BLD-MCNC: 1.14 MG/DL (ref 0.7–1.3)
EGFRCR SERPLBLD CKD-EPI 2021: 82 ML/MIN/1.73M2 (ref 60–?)
EOSINOPHIL # BLD AUTO: 0.12 X10(3) UL (ref 0–0.7)
EOSINOPHIL NFR BLD AUTO: 1.2 %
ERYTHROCYTE [DISTWIDTH] IN BLOOD BY AUTOMATED COUNT: 13.2 %
GLOBULIN PLAS-MCNC: 2.9 G/DL (ref 2–3.5)
GLUCOSE BLD-MCNC: 101 MG/DL (ref 70–99)
GLUCOSE UR STRIP.AUTO-MCNC: NORMAL MG/DL
HCT VFR BLD AUTO: 42.1 % (ref 39–53)
HGB BLD-MCNC: 14.1 G/DL (ref 13–17.5)
IMM GRANULOCYTES # BLD AUTO: 0.03 X10(3) UL (ref 0–1)
IMM GRANULOCYTES NFR BLD: 0.3 %
KETONES UR STRIP.AUTO-MCNC: NEGATIVE MG/DL
LEUKOCYTE ESTERASE UR QL STRIP.AUTO: NEGATIVE
LYMPHOCYTES # BLD AUTO: 0.98 X10(3) UL (ref 1–4)
LYMPHOCYTES NFR BLD AUTO: 9.6 %
MCH RBC QN AUTO: 28.5 PG (ref 26–34)
MCHC RBC AUTO-ENTMCNC: 33.5 G/DL (ref 31–37)
MCV RBC AUTO: 85.2 FL (ref 80–100)
MONOCYTES # BLD AUTO: 0.79 X10(3) UL (ref 0.1–1)
MONOCYTES NFR BLD AUTO: 7.7 %
NEUTROPHILS # BLD AUTO: 8.32 X10 (3) UL (ref 1.5–7.7)
NEUTROPHILS # BLD AUTO: 8.32 X10(3) UL (ref 1.5–7.7)
NEUTROPHILS NFR BLD AUTO: 81.1 %
NITRITE UR QL STRIP.AUTO: NEGATIVE
OSMOLALITY SERPL CALC.SUM OF ELEC: 284 MOSM/KG (ref 275–295)
PH UR STRIP.AUTO: 5.5 [PH] (ref 5–8)
PLATELET # BLD AUTO: 329 10(3)UL (ref 150–450)
POTASSIUM SERPL-SCNC: 4 MMOL/L (ref 3.5–5.1)
PROT SERPL-MCNC: 7.2 G/DL (ref 5.7–8.2)
PROT UR STRIP.AUTO-MCNC: NEGATIVE MG/DL
RBC # BLD AUTO: 4.94 X10(6)UL (ref 4.3–5.7)
RBC UR QL AUTO: NEGATIVE
SODIUM SERPL-SCNC: 138 MMOL/L (ref 136–145)
SP GR UR STRIP.AUTO: 1.01 (ref 1–1.03)
UROBILINOGEN UR STRIP.AUTO-MCNC: NORMAL MG/DL
WBC # BLD AUTO: 10.3 X10(3) UL (ref 4–11)

## 2025-07-07 PROCEDURE — 80053 COMPREHEN METABOLIC PANEL: CPT | Performed by: EMERGENCY MEDICINE

## 2025-07-07 PROCEDURE — 85025 COMPLETE CBC W/AUTO DIFF WBC: CPT | Performed by: EMERGENCY MEDICINE

## 2025-07-07 PROCEDURE — 81003 URINALYSIS AUTO W/O SCOPE: CPT | Performed by: EMERGENCY MEDICINE

## 2025-07-07 PROCEDURE — 96375 TX/PRO/DX INJ NEW DRUG ADDON: CPT

## 2025-07-07 PROCEDURE — 99284 EMERGENCY DEPT VISIT MOD MDM: CPT

## 2025-07-07 PROCEDURE — 74176 CT ABD & PELVIS W/O CONTRAST: CPT | Performed by: EMERGENCY MEDICINE

## 2025-07-07 PROCEDURE — 99285 EMERGENCY DEPT VISIT HI MDM: CPT

## 2025-07-07 PROCEDURE — 96374 THER/PROPH/DIAG INJ IV PUSH: CPT

## 2025-07-07 PROCEDURE — 96361 HYDRATE IV INFUSION ADD-ON: CPT

## 2025-07-07 RX ORDER — HYDROMORPHONE HYDROCHLORIDE 1 MG/ML
0.5 INJECTION, SOLUTION INTRAMUSCULAR; INTRAVENOUS; SUBCUTANEOUS ONCE
Refills: 0 | Status: COMPLETED | OUTPATIENT
Start: 2025-07-07 | End: 2025-07-07

## 2025-07-07 RX ORDER — ONDANSETRON 4 MG/1
4 TABLET, ORALLY DISINTEGRATING ORAL EVERY 4 HOURS PRN
Qty: 10 TABLET | Refills: 0 | Status: SHIPPED | OUTPATIENT
Start: 2025-07-07 | End: 2025-07-14

## 2025-07-07 RX ORDER — DICYCLOMINE HCL 20 MG
20 TABLET ORAL 4 TIMES DAILY PRN
Qty: 15 TABLET | Refills: 0 | Status: SHIPPED | OUTPATIENT
Start: 2025-07-07 | End: 2025-07-11

## 2025-07-07 RX ORDER — KETOROLAC TROMETHAMINE 15 MG/ML
15 INJECTION, SOLUTION INTRAMUSCULAR; INTRAVENOUS ONCE
Status: COMPLETED | OUTPATIENT
Start: 2025-07-07 | End: 2025-07-07

## 2025-07-07 RX ORDER — ROSUVASTATIN CALCIUM 10 MG/1
10 TABLET, COATED ORAL DAILY
COMMUNITY

## 2025-07-07 RX ORDER — LISINOPRIL 5 MG/1
5 TABLET ORAL DAILY
COMMUNITY

## 2025-07-07 RX ORDER — SILDENAFIL 50 MG/1
50 TABLET, FILM COATED ORAL
COMMUNITY

## 2025-07-07 NOTE — ED PROVIDER NOTES
Patient Seen in: [facility] Emergency Department       The following individual(s) verbally consented to be recorded using ambient AI listening technology and understand that they can each withdraw their consent to this listening technology at any point by asking the clinician to turn off or pause the recording:    Patient name: Andres Raímrez  Additional names:        History  Chief Complaint   Patient presents with    Abdomen/Flank Pain     Stated Complaint: abd pain, nausea and vomiting    Subjective:   HPI     nAdres Ramírez is a 45 year old male who presents with abdominal pain and diarrhea.    He has been experiencing steady abdominal pain located in the upper left quadrant since yesterday, which increases with movement and makes it difficult for him to get comfortable.    He also experienced diarrhea throughout the day yesterday, despite taking three doses of Imodium, and subsequently vomited twice in the afternoon. He is currently taking Zofran but continues to feel unwell.    Approximately six to seven weeks ago, he was diagnosed with a kidney stone that has not yet passed. He describes a numbing pain that radiates to his back, similar to previous kidney stone pain, but notes that the current symptoms feel different.    He was diagnosed with diabetes three weeks ago and has started new medications, including Modjaro. He denies recent antibiotic use.    He reports pain in his testicles but denies burning urination or blood in his urine. He has a history of abdominal surgeries but does not specify the type.        Objective:     Past Medical History:    Calculus of kidney    Esophageal reflux    Sleep apnea    no equipment              Past Surgical History:   Procedure Laterality Date    Removal gallbladder      Removal of kidney stone                  Social History     Socioeconomic History    Marital status:    Tobacco Use    Smoking status: Every Day     Current packs/day:  0.50     Types: Cigarettes    Smokeless tobacco: Never   Vaping Use    Vaping status: Never Used   Substance and Sexual Activity    Alcohol use: Yes     Alcohol/week: 1.0 standard drink of alcohol     Types: 1 Standard drinks or equivalent per week    Drug use: Yes     Types: Cannabis                                Physical Exam    ED Triage Vitals [07/07/25 0424]   /75   Pulse 101   Resp 16   Temp 98.6 °F (37 °C)   Temp src Temporal   SpO2 97 %   O2 Device None (Room air)       Current Vitals:   Vital Signs  BP: 118/67  Pulse: 73  Resp: 16  Temp: 98.6 °F (37 °C)  Temp src: Temporal  MAP (mmHg): 83    Oxygen Therapy  SpO2: 100 %  O2 Device: None (Room air)            Physical Exam  The patient is alert and orient x 3 no acute distress HEENT exam within normal limits neck there is no lymphadenopathy or JVD lungs are clear cardiovascular exam shows regular rate and rhythm without murmurs abdomen soft there is mild to moderate left upper quadrant left lower quadrant tenderness there is no masses guarding or rebound there is mild left flank tenderness extremities normal skin is no rash neurologic exams normal.        ED Course  Labs Reviewed   COMP METABOLIC PANEL (14) - Abnormal; Notable for the following components:       Result Value    Glucose 101 (*)     BUN 8 (*)     All other components within normal limits   CBC WITH DIFFERENTIAL WITH PLATELET - Abnormal; Notable for the following components:    Neutrophil Absolute Prelim 8.32 (*)     Neutrophil Absolute 8.32 (*)     Lymphocyte Absolute 0.98 (*)     All other components within normal limits   URINALYSIS WITH CULTURE REFLEX   GI STOOL PANEL BY PCR   C. DIFFICILE(TOXIGENIC)PCR          Abnormal Labs Reviewed   COMP METABOLIC PANEL (14) - Abnormal; Notable for the following components:       Result Value    Glucose 101 (*)     BUN 8 (*)     All other components within normal limits   CBC WITH DIFFERENTIAL WITH PLATELET - Abnormal; Notable for the following  components:    Neutrophil Absolute Prelim 8.32 (*)     Neutrophil Absolute 8.32 (*)     Lymphocyte Absolute 0.98 (*)     All other components within normal limits     Labs unremarkable  CT ABDOMEN+PELVIS KIDNEYSTONE 2D RNDR(NO IV,NO ORAL)(CPT=74176)  Result Date: 7/7/2025  CONCLUSION: Nonobstructing left kidney stone. No hydronephrosis. Probable enteritis. Electronically Verified and Signed by Attending Radiologist: Alessio Banda MD 7/7/2025 7:33 AM Workstation: [id number]    Images independent reviewed there is a nonobstructing left kidney stone there is some probable enteritis                  MDM     Initial differential diagnosis includes but not limited to pancreatitis gastroenteritis diverticulitis kidney stone        Medical Decision Making  Patient feels better after IV fluids pain medication is workup reveals probable gastroenteritis advised clear liquids the next 24 hours return if worse he was given Zofran and dicyclomine advised return as needed.    Disposition and Plan     Clinical Impression:  1. Gastroenteritis         Disposition:  Discharge  7/7/2025  8:45 am    Follow-up:  Hossein Arroyo MD  [address] [place] [address]  [phone]    Follow up in 3 day(s)  As needed          Medications Prescribed:  Current Discharge Medication List        START taking these medications    Details   ondansetron 4 MG Oral Tablet Dispersible Take 1 tablet (4 mg total) by mouth every 4 (four) hours as needed for Nausea.  Qty: 10 tablet, Refills: 0      dicyclomine 20 MG Oral Tab Take 1 tablet (20 mg total) by mouth 4 (four) times daily as needed.  Qty: 15 tablet, Refills: 0                   Supplementary Documentation:

## 2025-07-07 NOTE — ED INITIAL ASSESSMENT (HPI)
Pt to the emergency room for epigastric abdominal pain that has been ongoing since yesterday accompanied by diarrhea and vomiting. Pt states he has been trying to take imodium and zofran with no improvement in symptoms. No fevers noted at home. No other concerns at this time.

## 2025-07-07 NOTE — DISCHARGE INSTRUCTIONS
Clear liquids today then advance diet tomorrow.  Tylenol or Advil for milder pain take the dicyclomine for crampy abdominal pain take Zofran as needed for nausea vomiting.  For diarrhea you can use over-the-counter Imodium return if worse

## 2025-07-23 ENCOUNTER — OFFICE VISIT (OUTPATIENT)
Dept: SLEEP MEDICINE | Age: 45
End: 2025-07-23
Attending: NURSE PRACTITIONER

## 2025-07-23 DIAGNOSIS — G47.33 OBSTRUCTIVE SLEEP APNEA SYNDROME: ICD-10-CM

## 2025-07-23 LAB — REPORT TEXT: NORMAL

## 2025-07-23 PROCEDURE — 95800 SLP STDY UNATTENDED: CPT | Performed by: SPECIALIST

## 2025-07-28 ENCOUNTER — RESULTS FOLLOW-UP (OUTPATIENT)
Dept: SLEEP MEDICINE | Age: 45
End: 2025-07-28

## (undated) DEVICE — SLEEVE COMPR M KNEE LEN SGL USE KENDALL SCD

## (undated) DEVICE — DILATOR/SHEATH SET: Brand: 8/10 DILATOR/SHEATH SET

## (undated) DEVICE — CONRAY 60% VI

## (undated) DEVICE — SEAL BIOPSY PORT ACMI

## (undated) DEVICE — ADHESIVE LIQ 2/3ML VI MASTISOL

## (undated) DEVICE — SOLUTION IRRIG 3000ML 0.9% NACL FLX CONT

## (undated) DEVICE — SENS GUIW URO 150CM NIT SS

## (undated) DEVICE — SKN PREP SPNG STKS PVP PNT STR: Brand: MEDLINE INDUSTRIES, INC.

## (undated) DEVICE — SYSTEM PMP SYR 10CC VAC SGL ACT 1 W VLV

## (undated) DEVICE — NITINOL STONE RETRIEVAL BASKET: Brand: ZERO TIP

## (undated) DEVICE — STERILE POLYISOPRENE POWDER-FREE SURGICAL GLOVES: Brand: PROTEXIS

## (undated) DEVICE — SYRINGE MED 20ML STD CLR PLAS LL TIP N CTRL

## (undated) DEVICE — FIBER LSR 200UM 2J 80HZ 60W DL FOR LITHO

## (undated) DEVICE — ZIPWIRE GUIDEWIRE .035X150 STR

## (undated) DEVICE — CYSTO CDS-LF: Brand: MEDLINE INDUSTRIES, INC.

## (undated) DEVICE — SINGLE-USE DIGITAL FLEXIBLE URETEROSCOPE: Brand: LITHOVUE

## (undated) DEVICE — ADAPTER URETSCP Y TYP ROT M LUER CONN TUOHY

## (undated) NOTE — LETTER
Date & Time: 1/11/2024, 10:00 AM  Patient: Andres Ramírez  Encounter Provider(s):    Amalia Garcia PA       To Whom It May Concern:    Andres Ramírez was seen and treated in our department on 1/11/2024. He should not return to work until 01/15/24 .    If you have any questions or concerns, please do not hesitate to call.        _____________________________  Physician/APC Signature

## (undated) NOTE — LETTER
[facility], [address] [place] [address], [address]  [place] [address]  [address]: [phone]  FAX: [phone]        To whom it may concern:       Patient Bebo Trimble is scheduled for surgery on 10/09/2023. Please excuse Chani Marlow from work from 10/9/23 until 10/11/2023. He may return to work on 10/12/2023. If you have any questions, feel free to give us a call at [phone].         Thank you,    / Flor Venegas, DEYANIRA              10/04/23  Lottie Holt, :  3/8/1980  [address] [place] [address] [id number]

## (undated) NOTE — LETTER
Date & Time: 11/7/2022, 10:16 AM  Patient: Lucy He  Encounter Provider(s):    KIRIT Malone       To Whom It May Concern:    Silvia Gamble was seen and treated in our department on 11/7/2022. Patient has been diagnosed with influenza A. Patient will return to work on Thursday or Wednesday if feeling better and fever free.     If you have any questions or concerns, please do not hesitate to call.        _____________________________  Physician/APC Signature